# Patient Record
Sex: MALE | Race: BLACK OR AFRICAN AMERICAN | NOT HISPANIC OR LATINO | Employment: OTHER | URBAN - NONMETROPOLITAN AREA
[De-identification: names, ages, dates, MRNs, and addresses within clinical notes are randomized per-mention and may not be internally consistent; named-entity substitution may affect disease eponyms.]

---
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
## 2019-05-28 NOTE — PROGRESS NOTES
"   Subjective:  Gila Cerrato is a 19 y.o. male who presents for       Patient Active Problem List   Diagnosis   • Annual physical exam   • General medical examination   • Depression   • Encounter for screening for endocrine disorder   • Class 1 obesity with body mass index (BMI) of 34.0 to 34.9 in adult   • Autism           Current Outpatient Medications:   •  Selenium Sulfide 2.25 % lotion, Apply 1 application topically Daily. Apply daily for 5-15 minutes and then rinsed. Use daily for 1 week, then weekly for 1 month., Disp: 180 mL, Rfl: 3    7/18/18 Pt is 17 yo male who I am seeing for the first time. He is here to establish. He is accompanied with mother today. He was previously seen by Dr. Ren his previous PCP in [place] for about a year.  He currently lives with ecsar and was living with father before. Mother states he was tested for autism at age 1 but is not sure if he truly has this. He was enrolled in [employer] and was enrolled there from 3-5 years.  He graduated from [employer] at 17 years old. He is currently not enrolled in school.  Mother was also told by Pediatrician he has \"intellectual delay\" that he would outgrow it.  He is currently not taking medications.  Mother is legal guardian and states he is depressed lately. He states that there are things that he is aware about in the outside world that bother him.  Mother is concerned about his communication along with depression.  Today pt tells me he likes to draw animals. He also mentions the words \"anger, bam and disgust\" on several occasions     5/3019 pt is here for recheck and followup. He has CMH of autiism and depression. He has yet to get labwork done on last visit. Pt has history of autism and mother is concerned if pt is suitable to at home alone.  Pt's mother accompanied him today and is requesting letter stating he should not be at home alone. Mother is not working now. She states she " has left him home alone before.   Per mother One of the counselors at school who saw pt in the patient recommended pt not be not alone at home  Mother helps patient assist with daily activities.  Pt also gained 50 lbs since last visit. Mother states he eats a lot of junk foods and processed foods       Obesity   This is a chronic problem. The current episode started more than 1 year ago. The problem occurs constantly. Associated symptoms include fatigue. Pertinent negatives include no abdominal pain, anorexia, arthralgias, change in bowel habit, chest pain, chills, congestion, coughing, diaphoresis, fever, headaches, joint swelling, myalgias, nausea, neck pain, numbness, rash, sore throat, swollen glands, urinary symptoms, vertigo, visual change, vomiting or weakness. Nothing aggravates the symptoms. He has tried nothing for the symptoms. The treatment provided no relief.   Mental Health Problem   The primary symptoms do not include dysphoric mood, delusions, hallucinations, bizarre behavior, disorganized speech, negative symptoms or somatic symptoms. This is a chronic problem.   Additional symptoms of the illness include fatigue. Additional symptoms of the illness do not include anhedonia, insomnia, hypersomnia, appetite change, unexpected weight change, agitation, psychomotor retardation, feelings of worthlessness, attention impairment, euphoric mood, increased goal-directed activity, flight of ideas, inflated self-esteem, decreased need for sleep, distractible, poor judgment, visual change, headaches, abdominal pain or seizures. He does not admit to suicidal ideas. He does not have a plan to commit suicide. He does not contemplate harming himself. He has not already injured self. He does not contemplate injuring another person.    Depression   Visit Type: initial  Progression since onset: gradually worsening  Patient is not experiencing: anhedonia, chest pain, choking sensation, compulsions, confusion, decreased  concentration, depressed mood, dizziness, dry mouth, excessive worry, fatigue, feelings of hopelessness, feelings of worthlessness, hypersomnia, hyperventilation, impotence, insomnia, irritability, malaise, memory impairment, muscle tension, nausea, nervousness/anxiety, obsessions, palpitations, panic, psychomotor agitation, psychomotor retardation, restlessness, shortness of breath, suicidal ideas, suicidal planning, thoughts of death, weight gain and weight loss.  Severity: moderate   Sleep quality: good       Review of Systems  Review of Systems   Constitutional: Positive for activity change and fatigue. Negative for appetite change, chills, diaphoresis, fever and unexpected weight change.   HENT: Negative for congestion, postnasal drip, rhinorrhea, sinus pressure, sinus pain, sneezing, sore throat, trouble swallowing and voice change.    Respiratory: Negative for cough, choking, chest tightness, shortness of breath, wheezing and stridor.    Cardiovascular: Negative for chest pain.   Gastrointestinal: Negative for abdominal pain, anorexia, change in bowel habit, diarrhea, nausea and vomiting.   Musculoskeletal: Negative for arthralgias, joint swelling, myalgias and neck pain.   Skin: Negative for rash.   Neurological: Negative for vertigo, seizures, weakness, numbness and headaches.   Psychiatric/Behavioral: Negative for agitation, dysphoric mood and hallucinations. The patient does not have insomnia.        Patient Active Problem List   Diagnosis   • Annual physical exam   • General medical examination   • Depression   • Encounter for screening for endocrine disorder   • Class 1 obesity with body mass index (BMI) of 34.0 to 34.9 in adult   • Autism     Past Surgical History:   Procedure Laterality Date   • INJECTION OF MEDICATION  11/19/2013    Dexamethasone (1)   • INNER EAR SURGERY  08/20/2004    Retained tube of right ear, extruding tube of left ear into the ext. auditory canal wiht persistent tympanic  "membrane perforation and bilateral creumen impactions   • MYRINGOTOMY Bilateral 06/29/2001     Social History     Socioeconomic History   • Marital status: Single     Spouse name: Not on file   • Number of children: Not on file   • Years of education: Not on file   • Highest education level: Not on file   Tobacco Use   • Smoking status: Never Smoker   • Smokeless tobacco: Never Used   Substance and Sexual Activity   • Alcohol use: No   • Sexual activity: No     Family History   Problem Relation Age of Onset   • Arthritis Mother    • Hypertension Mother    • Cancer Sister    • Depression Sister    • Anxiety disorder Maternal Aunt    • Depression Maternal Aunt    • Cancer Paternal Aunt    • Arthritis Maternal Grandmother    • Depression Maternal Grandmother    • Hypertension Maternal Grandmother    • Cancer Maternal Grandfather    • Cancer Paternal Grandmother      No visits with results within 6 Month(s) from this visit.   Latest known visit with results is:   Conversion Encounter on 08/20/2004   Component Date Value Ref Range Status   • Spec Descr 1 08/20/2004 SPECIMEN(S): A EAR TUBES   Final   • Gross Description 08/20/2004    Final                    Value:  GROSS DESCRIPTION:  The specimen is labeled \"retained ear tubes\" and consists of 2  bobbin-shaped blue ear tubes measuring 0.2 cm in length.  The flanges on  each end measure 0.3 cm in diameter.  The tubes have some brown material  attached.  No sections are submitted.     • Final Diagnosis 08/20/2004    Final                    Value:  FINAL DIAGNOSIS:  PRESSURE EQUALIZING TUBES, BILATERAL EARS, REMOVAL:            GROSS EXAMINATION ONLY.              [facility]    DIAGNOSIS CODE:  3     • Comment 08/20/2004    Final                    Value:  CLINICAL DIAGNOSIS:  Ear tubes     • CONVERTED (HISTORICAL) FINAL PATHO* 08/20/2004    Final                    Value:Diagnostician:  ASMITA JACOBSEN M.D.  Pathologist  Electronically Signed 08/23/2004        No image results " "found.    [unfilled]  Immunization History   Administered Date(s) Administered   • DTaP 1999, 1999, 1999, 06/06/2000, 05/21/2003   • Hep B, Adolescent or Pediatric 1999, 1999, 1999   • Hib (HbOC) 1999, 1999, 1999, 06/06/2000   • IPV 1999, 1999, 1999, 06/06/2000   • MMR 06/06/2000, 05/21/2003   • Tdap 08/16/2010   • Varicella 06/06/2000, 08/16/2010       The following portions of the patient's history were reviewed and updated as appropriate: allergies, current medications, past family history, past medical history, past social history, past surgical history and problem list.        Physical Exam  /76   Pulse 91   Temp 97.9 °F (36.6 °C)   Ht 162.6 cm (64\")   Wt 89.9 kg (198 lb 3.2 oz)   SpO2 96%   BMI 34.02 kg/m²     Physical Exam   Constitutional: He is oriented to person, place, and time. He appears well-developed and well-nourished.   HENT:   Head: Normocephalic and atraumatic.   Right Ear: External ear normal.   Eyes: Conjunctivae and EOM are normal. Pupils are equal, round, and reactive to light.   Neck: Normal range of motion. Neck supple.   Cardiovascular: Normal rate, regular rhythm and normal heart sounds.   No murmur heard.  Pulmonary/Chest: Effort normal and breath sounds normal. No respiratory distress.   Abdominal: Soft. Bowel sounds are normal. He exhibits no distension. There is no tenderness.   Obese abdomen    Musculoskeletal: Normal range of motion. He exhibits no edema or deformity.   Neurological: He is alert and oriented to person, place, and time. No cranial nerve deficit.   Skin: Skin is warm. No rash noted. He is not diaphoretic. No erythema. No pallor.   Psychiatric: He has a normal mood and affect. His behavior is normal.   Nursing note and vitals reviewed.      Assessment/Plan    Diagnosis Plan   1. Autism     2. Episode of recurrent major depressive disorder, unspecified depression episode severity " (CMS/[facility])     3. Encounter for screening for endocrine disorder     4. Annual physical exam     5. General medical examination  CBC Auto Differential    Comprehensive Metabolic Panel    TSH    T4, Free    T3, Free    Hemoglobin A1c    Lipid Panel    Vitamin D 25 Hydroxy   6. Class 1 obesity with body mass index (BMI) of 34.0 to 34.9 in adult, unspecified obesity type, unspecified whether serious comorbidity present  Hemoglobin A1c    Lipid Panel    Vitamin D 25 Hydroxy      -obesity -counseled weight loss >5 mintues. Gave healthy diet eating tips and high fiber diet   -wrote letter stating today pt should not be left at home alone due to safety issues counseled mother about safety of patient.   - will get thyroid studies cbc, cmp and get labwork   -annual physical exam done today  -advised pt to be safe and call with any questions or concerns. All questions addressed today.    -depression - will refer to counseling at [facility] for counseling . Evaluation appreciated.   -consider antidepressant such as zoloft. Pt is not suicidal  -gave information on austim and depression today.    -recheck in 6 months   -advised pt to be safe and call with any questions or concerns        This document has been electronically signed by Chalo Sinha MD on May 30, 2019 1:36 PM                    This document has been electronically signed by Chalo Sinha MD on May 30, 2019 1:36 PM

## 2019-05-30 ENCOUNTER — OFFICE VISIT (OUTPATIENT)
Dept: FAMILY MEDICINE CLINIC | Facility: CLINIC | Age: 19
End: 2019-05-30

## 2019-05-30 VITALS
SYSTOLIC BLOOD PRESSURE: 116 MMHG | OXYGEN SATURATION: 96 % | HEIGHT: 64 IN | TEMPERATURE: 97.9 F | WEIGHT: 198.2 LBS | DIASTOLIC BLOOD PRESSURE: 76 MMHG | BODY MASS INDEX: 33.84 KG/M2 | HEART RATE: 91 BPM

## 2019-05-30 DIAGNOSIS — F84.0 AUTISM: Primary | ICD-10-CM

## 2019-05-30 DIAGNOSIS — Z00.00 GENERAL MEDICAL EXAMINATION: ICD-10-CM

## 2019-05-30 DIAGNOSIS — E66.9 CLASS 1 OBESITY WITH BODY MASS INDEX (BMI) OF 34.0 TO 34.9 IN ADULT, UNSPECIFIED OBESITY TYPE, UNSPECIFIED WHETHER SERIOUS COMORBIDITY PRESENT: ICD-10-CM

## 2019-05-30 DIAGNOSIS — Z00.00 ANNUAL PHYSICAL EXAM: ICD-10-CM

## 2019-05-30 DIAGNOSIS — Z13.29 ENCOUNTER FOR SCREENING FOR ENDOCRINE DISORDER: ICD-10-CM

## 2019-05-30 DIAGNOSIS — F33.9 EPISODE OF RECURRENT MAJOR DEPRESSIVE DISORDER, UNSPECIFIED DEPRESSION EPISODE SEVERITY (HCC): ICD-10-CM

## 2019-05-30 PROCEDURE — 99214 OFFICE O/P EST MOD 30 MIN: CPT | Performed by: FAMILY MEDICINE

## 2019-05-30 NOTE — PATIENT INSTRUCTIONS
High-Fiber Diet  Fiber, also called dietary fiber, is a type of carbohydrate found in fruits, vegetables, whole grains, and beans. A high-fiber diet can have many health benefits. Your health care provider may recommend a high-fiber diet to help:  · Prevent constipation. Fiber can make your bowel movements more regular.  · Lower your cholesterol.  · Relieve hemorrhoids, uncomplicated diverticulosis, or irritable bowel syndrome.  · Prevent overeating as part of a weight-loss plan.  · Prevent heart disease, type 2 diabetes, and certain cancers.    What is my plan?  The recommended daily intake of fiber includes:  · 38 grams for men under age 50.  · 30 grams for men over age 50.  · 25 grams for women under age 50.  · 21 grams for women over age 50.    You can get the recommended daily intake of dietary fiber by eating a variety of fruits, vegetables, grains, and beans. Your health care provider may also recommend a fiber supplement if it is not possible to get enough fiber through your diet.  What do I need to know about a high-fiber diet?  · Fiber supplements have not been widely studied for their effectiveness, so it is better to get fiber through food sources.  · Always check the fiber content on the nutrition facts label of any prepackaged food. Look for foods that contain at least 5 grams of fiber per serving.  · Ask your dietitian if you have questions about specific foods that are related to your condition, especially if those foods are not listed in the following section.  · Increase your daily fiber consumption gradually. Increasing your intake of dietary fiber too quickly may cause bloating, cramping, or gas.  · Drink plenty of water. Water helps you to digest fiber.  What foods can I eat?  Grains  Whole-grain breads. Multigrain cereal. Oats and oatmeal. Brown rice. Barley. Bulgur wheat. Millet. Bran muffins. Popcorn. Rye wafer crackers.  Vegetables  Sweet potatoes. Spinach. Kale. Artichokes. Cabbage.  Broccoli. Green peas. Carrots. Squash.  Fruits  Berries. Pears. Apples. Oranges. Avocados. Prunes and raisins. Dried figs.  Meats and Other Protein Sources  Navy, kidney, beltran, and soy beans. Split peas. Lentils. Nuts and seeds.  Dairy  Fiber-fortified yogurt.  Beverages  Fiber-fortified soy milk. Fiber-fortified orange juice.  Other  Fiber bars.  The items listed above may not be a complete list of recommended foods or beverages. Contact your dietitian for more options.  What foods are not recommended?  Grains  White bread. Pasta made with refined flour. White rice.  Vegetables  Fried potatoes. Canned vegetables. Well-cooked vegetables.  Fruits  Fruit juice. Cooked, strained fruit.  Meats and Other Protein Sources  Fatty cuts of meat. Fried poultry or fried fish.  Dairy  Milk. Yogurt. Cream cheese. Sour cream.  Beverages  Soft drinks.  Other  Cakes and pastries. Butter and oils.  The items listed above may not be a complete list of foods and beverages to avoid. Contact your dietitian for more information.  What are some tips for including high-fiber foods in my diet?  · Eat a wide variety of high-fiber foods.  · Make sure that half of all grains consumed each day are whole grains.  · Replace breads and cereals made from refined flour or white flour with whole-grain breads and cereals.  · Replace white rice with brown rice, bulgur wheat, or millet.  · Start the day with a breakfast that is high in fiber, such as a cereal that contains at least 5 grams of fiber per serving.  · Use beans in place of meat in soups, salads, or pasta.  · Eat high-fiber snacks, such as berries, raw vegetables, nuts, or popcorn.  This information is not intended to replace advice given to you by your health care provider. Make sure you discuss any questions you have with your health care provider.  Document Released: 12/18/2006 Document Revised: 05/25/2017 Document Reviewed: 06/02/2015  Elsevier Interactive Patient Education © 2018  Elsevier Inc.

## 2019-07-01 ENCOUNTER — OFFICE VISIT (OUTPATIENT)
Dept: FAMILY MEDICINE CLINIC | Facility: CLINIC | Age: 19
End: 2019-07-01

## 2019-07-01 VITALS
HEIGHT: 64 IN | WEIGHT: 196.8 LBS | TEMPERATURE: 98.6 F | OXYGEN SATURATION: 98 % | HEART RATE: 82 BPM | DIASTOLIC BLOOD PRESSURE: 70 MMHG | SYSTOLIC BLOOD PRESSURE: 110 MMHG | BODY MASS INDEX: 33.6 KG/M2

## 2019-07-01 DIAGNOSIS — F33.9 EPISODE OF RECURRENT MAJOR DEPRESSIVE DISORDER, UNSPECIFIED DEPRESSION EPISODE SEVERITY (HCC): ICD-10-CM

## 2019-07-01 DIAGNOSIS — E66.9 CLASS 1 OBESITY WITH BODY MASS INDEX (BMI) OF 34.0 TO 34.9 IN ADULT, UNSPECIFIED OBESITY TYPE, UNSPECIFIED WHETHER SERIOUS COMORBIDITY PRESENT: Primary | ICD-10-CM

## 2019-07-01 DIAGNOSIS — F84.0 AUTISM: ICD-10-CM

## 2019-07-01 PROCEDURE — 99214 OFFICE O/P EST MOD 30 MIN: CPT | Performed by: FAMILY MEDICINE

## 2019-07-02 ENCOUNTER — LAB (OUTPATIENT)
Dept: LAB | Facility: HOSPITAL | Age: 19
End: 2019-07-02

## 2019-07-02 DIAGNOSIS — E66.9 CLASS 1 OBESITY WITH BODY MASS INDEX (BMI) OF 34.0 TO 34.9 IN ADULT, UNSPECIFIED OBESITY TYPE, UNSPECIFIED WHETHER SERIOUS COMORBIDITY PRESENT: ICD-10-CM

## 2019-07-02 DIAGNOSIS — Z00.00 GENERAL MEDICAL EXAMINATION: ICD-10-CM

## 2019-07-02 LAB
25(OH)D3 SERPL-MCNC: 11.8 NG/ML (ref 30–100)
ALBUMIN SERPL-MCNC: 4.9 G/DL (ref 3.5–5.2)
ALBUMIN/GLOB SERPL: 1.4 G/DL
ALP SERPL-CCNC: 71 U/L (ref 39–117)
ALT SERPL W P-5'-P-CCNC: 17 U/L (ref 1–41)
ANION GAP SERPL CALCULATED.3IONS-SCNC: 10.5 MMOL/L (ref 5–15)
AST SERPL-CCNC: 18 U/L (ref 1–40)
BILIRUB SERPL-MCNC: 1 MG/DL (ref 0.2–1.2)
BUN BLD-MCNC: 12 MG/DL (ref 6–20)
BUN/CREAT SERPL: 13.3 (ref 7–25)
CALCIUM SPEC-SCNC: 9.8 MG/DL (ref 8.6–10.5)
CHLORIDE SERPL-SCNC: 96 MMOL/L (ref 98–107)
CHOLEST SERPL-MCNC: 162 MG/DL (ref 0–200)
CO2 SERPL-SCNC: 27.5 MMOL/L (ref 22–29)
CREAT BLD-MCNC: 0.9 MG/DL (ref 0.76–1.27)
GFR SERPL CREATININE-BSD FRML MDRD: 130 ML/MIN/1.73
GLOBULIN UR ELPH-MCNC: 3.5 GM/DL
GLUCOSE BLD-MCNC: 94 MG/DL (ref 65–99)
HDLC SERPL-MCNC: 47 MG/DL (ref 40–60)
LDLC SERPL CALC-MCNC: 93 MG/DL (ref 0–100)
LDLC/HDLC SERPL: 1.98 {RATIO}
POTASSIUM BLD-SCNC: 4.3 MMOL/L (ref 3.5–5.2)
PROT SERPL-MCNC: 8.4 G/DL (ref 6–8.5)
SODIUM BLD-SCNC: 134 MMOL/L (ref 136–145)
T3FREE SERPL-MCNC: 3.73 PG/ML (ref 2–4.4)
T4 FREE SERPL-MCNC: 1.27 NG/DL (ref 0.93–1.7)
TRIGL SERPL-MCNC: 110 MG/DL (ref 0–150)
TSH SERPL DL<=0.05 MIU/L-ACNC: 1.19 MIU/ML (ref 0.27–4.2)
VLDLC SERPL-MCNC: 22 MG/DL (ref 5–40)

## 2019-07-02 PROCEDURE — 84439 ASSAY OF FREE THYROXINE: CPT | Performed by: FAMILY MEDICINE

## 2019-07-02 PROCEDURE — 84481 FREE ASSAY (FT-3): CPT | Performed by: FAMILY MEDICINE

## 2019-07-02 PROCEDURE — 85025 COMPLETE CBC W/AUTO DIFF WBC: CPT | Performed by: FAMILY MEDICINE

## 2019-07-02 PROCEDURE — 82306 VITAMIN D 25 HYDROXY: CPT

## 2019-07-02 PROCEDURE — 83036 HEMOGLOBIN GLYCOSYLATED A1C: CPT

## 2019-07-02 PROCEDURE — 80061 LIPID PANEL: CPT

## 2019-07-02 PROCEDURE — 80053 COMPREHEN METABOLIC PANEL: CPT | Performed by: FAMILY MEDICINE

## 2019-07-02 PROCEDURE — 84443 ASSAY THYROID STIM HORMONE: CPT | Performed by: FAMILY MEDICINE

## 2019-07-03 ENCOUNTER — TELEPHONE (OUTPATIENT)
Dept: FAMILY MEDICINE CLINIC | Facility: CLINIC | Age: 19
End: 2019-07-03

## 2019-07-03 LAB
BASOPHILS # BLD AUTO: 0.07 10*3/MM3 (ref 0–0.2)
BASOPHILS NFR BLD AUTO: 0.9 % (ref 0–1.5)
DEPRECATED RDW RBC AUTO: 46.6 FL (ref 37–54)
EOSINOPHIL # BLD AUTO: 0.23 10*3/MM3 (ref 0–0.4)
EOSINOPHIL NFR BLD AUTO: 2.9 % (ref 0.3–6.2)
ERYTHROCYTE [DISTWIDTH] IN BLOOD BY AUTOMATED COUNT: 13 % (ref 12.3–15.4)
HBA1C MFR BLD: 5.4 % (ref 4.8–5.6)
HCT VFR BLD AUTO: 50.6 % (ref 37.5–51)
HGB BLD-MCNC: 16.6 G/DL (ref 13–17.7)
IMM GRANULOCYTES # BLD AUTO: 0.04 10*3/MM3 (ref 0–0.05)
IMM GRANULOCYTES NFR BLD AUTO: 0.5 % (ref 0–0.5)
LYMPHOCYTES # BLD AUTO: 2.41 10*3/MM3 (ref 0.7–3.1)
LYMPHOCYTES NFR BLD AUTO: 30.7 % (ref 19.6–45.3)
MCH RBC QN AUTO: 31.8 PG (ref 26.6–33)
MCHC RBC AUTO-ENTMCNC: 32.8 G/DL (ref 31.5–35.7)
MCV RBC AUTO: 96.9 FL (ref 79–97)
MONOCYTES # BLD AUTO: 0.63 10*3/MM3 (ref 0.1–0.9)
MONOCYTES NFR BLD AUTO: 8 % (ref 5–12)
NEUTROPHILS # BLD AUTO: 4.48 10*3/MM3 (ref 1.7–7)
NEUTROPHILS NFR BLD AUTO: 57 % (ref 42.7–76)
NRBC BLD AUTO-RTO: 0 /100 WBC (ref 0–0.2)
PLATELET # BLD AUTO: 305 10*3/MM3 (ref 140–450)
PMV BLD AUTO: 12.7 FL (ref 6–12)
RBC # BLD AUTO: 5.22 10*6/MM3 (ref 4.14–5.8)
WBC NRBC COR # BLD: 7.86 10*3/MM3 (ref 3.4–10.8)

## 2019-07-03 NOTE — TELEPHONE ENCOUNTER
----- Message from Chalo Sinha MD sent at 7/3/2019  7:20 AM CDT -----  Call pt' mother    Thyroid studies normal     CMP showed sodium and chloride slightly low.  Will need to monitor.     Kidney function stable   Liver function stable    Recheck on next visit. Thanks

## 2019-07-03 NOTE — TELEPHONE ENCOUNTER
----- Message from Chalo Sinha MD sent at 7/3/2019  7:20 AM CDT -----  Call pt's mother    Vitamin D is low and recommend pt start taking Vitamin D3 50,000 units once a week. Give 4 pills and 3 refills      Lipid panel normal    Awaiting rest of labwork  Still unable to reach

## 2019-07-08 ENCOUNTER — TELEPHONE (OUTPATIENT)
Dept: FAMILY MEDICINE CLINIC | Facility: CLINIC | Age: 19
End: 2019-07-08

## 2019-07-08 NOTE — TELEPHONE ENCOUNTER
----- Message from Chalo Sinha MD sent at 7/5/2019  7:58 PM CDT -----  Pt is not anemic. Hemoglobin normal     WBC is normal    Recheck on next visit. Thanks

## 2019-07-08 NOTE — TELEPHONE ENCOUNTER
----- Message from Chalo Sinha MD sent at 7/5/2019  7:57 PM CDT -----  hga1c normal.  Pt is not diabetic or prediabetic.  Called pt margy

## 2019-08-21 NOTE — PROGRESS NOTES
Subjective:  Gila Cerrato is a 19 y.o. male who presents for       Patient Active Problem List   Diagnosis   • Annual physical exam   • General medical examination   • Depression   • Encounter for screening for endocrine disorder   • Class 1 obesity with body mass index (BMI) of 34.0 to 34.9 in adult   • Autism   • Otitis externa of both ears           Current Outpatient Medications:   •  neomycin-colistin-hydrocortisone-thonzonium (CORTISPORIN-TC) 3.3-3-10-0.5 MG/ML otic suspension, Administer 3 drops into both ears 4 (Four) Times a Day for 5 days., Disp: 10 mL, Rfl: 0  •  Selenium Sulfide 2.25 % lotion, Apply 1 application topically Daily. Apply daily for 5-15 minutes and then rinsed. Use daily for 1 week, then weekly for 1 month., Disp: 180 mL, Rfl: 3  •  vitamin D (ERGOCALCIFEROL) 70373 units capsule capsule, Take 1 capsule by mouth Every 7 (Seven) Days., Disp: 4 capsule, Rfl: 3      5/3019 pt is here for recheck and followup. He has CMH of autiism and depression. He has yet to get labwork done on last visit. Pt has history of autism and mother is concerned if pt is suitable to at home alone.  Pt's mother accompanied him today and is requesting letter stating he should not be at home alone. Mother is not working now. She states she has left him home alone before.   Per mother One of the counselors at school who saw pt in the patient recommended pt not be not alone at home  Mother helps patient assist with daily activities.  Pt also gained 50 lbs since last visit. Mother states he eats a lot of junk foods and processed foods      7/1/19 pt is here for recheck. He has yet to get labwork. He is accompanied with mother today.  Pt has gained significant amount of weight.   He lost  2 lbs . No new changes since last visit.  Pt was seeing Elvia Waddell for his austism and has been over a year  Since he last saw them.   No chest pain no dizzines no shortness of air     8/22/19 pt is here for  recheck. He has two issues. He has trouble sleeping at night. He does not drink caffeine no loud noises no radio. He also has bilateral ear pain for  >2 weeks. No fever. It hurts when he pulls on his ears  He has recurrent ear inrfections and tubes in ears in the past.. He lost 5 lbs since last visit  Had labwork on 7/2/19 he had low Vitamin D. Thyroids tudies normal hga1c normal. Lipid panel normal. CMP showed sodium slightly low and chloride at  96.    Kidney function and liver function normal . CBC  Is normal       Earache    There is pain in both ears. This is a recurrent problem. The current episode started more than 1 month ago. The problem occurs constantly. The problem has been gradually worsening. There has been no fever. The pain is at a severity of 6/10. The pain is moderate. Pertinent negatives include no abdominal pain, coughing, diarrhea, ear discharge, headaches, hearing loss, neck pain, rash, rhinorrhea, sore throat or vomiting. He has tried nothing for the symptoms. The treatment provided no relief. There is no history of a chronic ear infection, hearing loss or a tympanostomy tube.    Obesity   This is a chronic problem. The current episode started more than 1 year ago. The problem occurs constantly. Associated symptoms include fatigue. Pertinent negatives include no abdominal pain, anorexia, arthralgias, change in bowel habit, chest pain, chills, congestion, coughing, diaphoresis, fever, headaches, joint swelling, myalgias, nausea, neck pain, numbness, rash, sore throat, swollen glands, urinary symptoms, vertigo, visual change, vomiting or weakness. Nothing aggravates the symptoms. He has tried nothing for the symptoms. The treatment provided no relief.   Mental Health Problem   The primary symptoms do not include dysphoric mood, delusions, hallucinations, bizarre behavior, disorganized speech, negative symptoms or somatic symptoms. This is a chronic problem.   Additional symptoms of the illness  include fatigue. Additional symptoms of the illness do not include anhedonia, insomnia, hypersomnia, appetite change, unexpected weight change, agitation, psychomotor retardation, feelings of worthlessness, attention impairment, euphoric mood, increased goal-directed activity, flight of ideas, inflated self-esteem, decreased need for sleep, distractible, poor judgment, visual change, headaches, abdominal pain or seizures. He does not admit to suicidal ideas. He does not have a plan to commit suicide. He does not contemplate harming himself. He has not already injured self. He does not contemplate injuring another person.    Depression   Visit Type: initial  Progression since onset: gradually worsening  Patient is not experiencing: anhedonia, chest pain, choking sensation, compulsions, confusion, decreased concentration, depressed mood, dizziness, dry mouth, excessive worry, fatigue, feelings of hopelessness, feelings of worthlessness, hypersomnia, hyperventilation, impotence, insomnia, irritability, malaise, memory impairment, muscle tension, nausea, nervousness/anxiety, obsessions, palpitations, panic, psychomotor agitation, psychomotor retardation, restlessness, shortness of breath, suicidal ideas, suicidal planning, thoughts of death, weight gain and weight loss.  Severity: moderate   Sleep quality: good    Review of Systems  Review of Systems   Constitutional: Positive for activity change and fatigue. Negative for appetite change, chills, diaphoresis and fever.   HENT: Positive for ear pain. Negative for congestion, ear discharge, hearing loss, postnasal drip, rhinorrhea, sinus pressure, sinus pain, sneezing, sore throat, trouble swallowing and voice change.    Respiratory: Negative for cough, choking, chest tightness, shortness of breath, wheezing and stridor.    Cardiovascular: Negative for chest pain.   Gastrointestinal: Negative for abdominal pain, diarrhea, nausea and vomiting.   Musculoskeletal: Negative  for neck pain.   Skin: Negative for rash.   Neurological: Negative for weakness and headaches.   Psychiatric/Behavioral: Positive for agitation, behavioral problems and decreased concentration. The patient is nervous/anxious.        Patient Active Problem List   Diagnosis   • Annual physical exam   • General medical examination   • Depression   • Encounter for screening for endocrine disorder   • Class 1 obesity with body mass index (BMI) of 34.0 to 34.9 in adult   • Autism   • Otitis externa of both ears     Past Surgical History:   Procedure Laterality Date   • INJECTION OF MEDICATION  11/19/2013    Dexamethasone (1)   • INNER EAR SURGERY  08/20/2004    Retained tube of right ear, extruding tube of left ear into the ext. auditory canal wiht persistent tympanic membrane perforation and bilateral creumen impactions   • MYRINGOTOMY Bilateral 06/29/2001     Social History     Socioeconomic History   • Marital status: Single     Spouse name: Not on file   • Number of children: Not on file   • Years of education: Not on file   • Highest education level: Not on file   Tobacco Use   • Smoking status: Never Smoker   • Smokeless tobacco: Never Used   Substance and Sexual Activity   • Alcohol use: No   • Sexual activity: No     Family History   Problem Relation Age of Onset   • Arthritis Mother    • Hypertension Mother    • Cancer Sister    • Depression Sister    • Anxiety disorder Maternal Aunt    • Depression Maternal Aunt    • Cancer Paternal Aunt    • Arthritis Maternal Grandmother    • Depression Maternal Grandmother    • Hypertension Maternal Grandmother    • Cancer Maternal Grandfather    • Cancer Paternal Grandmother      Lab on 07/02/2019   Component Date Value Ref Range Status   • Hemoglobin A1C 07/02/2019 5.40  4.80 - 5.60 % Final   • Total Cholesterol 07/02/2019 162  0 - 200 mg/dL Final   • Triglycerides 07/02/2019 110  0 - 150 mg/dL Final   • HDL Cholesterol 07/02/2019 47  40 - 60 mg/dL Final   • LDL Cholesterol  " 07/02/2019 93  0 - 100 mg/dL Final   • VLDL Cholesterol 07/02/2019 22  5 - 40 mg/dL Final   • LDL/HDL Ratio 07/02/2019 1.98   Final   • 25 Hydroxy, Vitamin D 07/02/2019 11.8* 30.0 - 100.0 ng/ml Final      No image results found.    @[email or web address]  Immunization History   Administered Date(s) Administered   • DTaP 1999, 1999, 1999, 06/06/2000, 05/21/2003   • Hep B, Adolescent or Pediatric 1999, 1999, 1999   • Hib (HbOC) 1999, 1999, 1999, 06/06/2000   • IPV 1999, 1999, 1999, 06/06/2000   • MMR 06/06/2000, 05/21/2003   • Tdap 08/16/2010   • Varicella 06/06/2000, 08/16/2010       The following portions of the patient's history were reviewed and updated as appropriate: allergies, current medications, past family history, past medical history, past social history, past surgical history and problem list.        Physical Exam  /80   Pulse 84   Temp 97.9 °F (36.6 °C)   Ht 162.6 cm (64\")   Wt 86.8 kg (191 lb 4.8 oz)   SpO2 97%   BMI 32.84 kg/m²     Physical Exam   Constitutional: He is oriented to person, place, and time. He appears well-developed and well-nourished.   HENT:   Head: Normocephalic and atraumatic.   Right Ear: External ear normal.   Ears:    Eyes: Conjunctivae and EOM are normal. Pupils are equal, round, and reactive to light.   Neck: Normal range of motion. Neck supple.   Cardiovascular: Normal rate, regular rhythm and normal heart sounds.   No murmur heard.  Pulmonary/Chest: Effort normal and breath sounds normal. No respiratory distress.   Abdominal: Soft. Bowel sounds are normal. He exhibits no distension. There is no tenderness.   Obese abdomen    Musculoskeletal: Normal range of motion. He exhibits no edema or deformity.   Neurological: He is alert and oriented to person, place, and time. No cranial nerve deficit.   Skin: Skin is warm. No rash noted. He is not diaphoretic. No erythema. No pallor.   Psychiatric: He has a " normal mood and affect. His behavior is normal.   Nursing note and vitals reviewed.      Assessment/Plan    Diagnosis Plan   1. Otitis externa of both ears, unspecified chronicity, unspecified type     2. Class 1 obesity with body mass index (BMI) of 34.0 to 34.9 in adult, unspecified obesity type, unspecified whether serious comorbidity present     3. Episode of recurrent major depressive disorder, unspecified depression episode severity (CMS/HCC)     4. Autism           -went over labwork   -obesity -counseled weight loss >5 mintues. Gave healthy diet eating tips and high fiber diet   -wrote letter stating today pt should not be left at home alone due to safety issues counseled mother about safety of patient.   -filled [employer] form   -advised pt to be safe and call with any questions or concerns. All questions addressed today.    -depression - will refer to counseling at [facility] for counseling . Evaluation appreciated.   -consider antidepressant such as zoloft. Pt is not suicidal  -gave information on austim and depression today.    -recheck in  3 months   -vitamin D deficiency - vitamin D once a week  -otitis externa - cortisporin drop 4 drops to each ear 3 times a day for 5 days   -advised pt to be safe and call with any questions or concerns        This document has been electronically signed by Chalo Sinha MD on August 22, 2019 3:26 PM

## 2019-08-22 ENCOUNTER — OFFICE VISIT (OUTPATIENT)
Dept: FAMILY MEDICINE CLINIC | Facility: CLINIC | Age: 19
End: 2019-08-22

## 2019-08-22 VITALS
OXYGEN SATURATION: 97 % | BODY MASS INDEX: 32.66 KG/M2 | TEMPERATURE: 97.9 F | HEART RATE: 84 BPM | DIASTOLIC BLOOD PRESSURE: 80 MMHG | HEIGHT: 64 IN | WEIGHT: 191.3 LBS | SYSTOLIC BLOOD PRESSURE: 120 MMHG

## 2019-08-22 DIAGNOSIS — H60.93 OTITIS EXTERNA OF BOTH EARS, UNSPECIFIED CHRONICITY, UNSPECIFIED TYPE: Primary | ICD-10-CM

## 2019-08-22 DIAGNOSIS — F84.0 AUTISM: ICD-10-CM

## 2019-08-22 DIAGNOSIS — F33.9 EPISODE OF RECURRENT MAJOR DEPRESSIVE DISORDER, UNSPECIFIED DEPRESSION EPISODE SEVERITY (HCC): ICD-10-CM

## 2019-08-22 DIAGNOSIS — E66.9 CLASS 1 OBESITY WITH BODY MASS INDEX (BMI) OF 34.0 TO 34.9 IN ADULT, UNSPECIFIED OBESITY TYPE, UNSPECIFIED WHETHER SERIOUS COMORBIDITY PRESENT: ICD-10-CM

## 2019-08-22 PROCEDURE — 99214 OFFICE O/P EST MOD 30 MIN: CPT | Performed by: FAMILY MEDICINE

## 2019-08-22 RX ORDER — ERGOCALCIFEROL 1.25 MG/1
50000 CAPSULE ORAL
Qty: 4 CAPSULE | Refills: 3 | Status: SHIPPED | OUTPATIENT
Start: 2019-08-22 | End: 2020-01-06 | Stop reason: SDUPTHER

## 2019-08-22 RX ORDER — AMOXICILLIN 500 MG/1
1000 CAPSULE ORAL 2 TIMES DAILY
Qty: 10 CAPSULE | Refills: 0 | Status: SHIPPED | OUTPATIENT
Start: 2019-08-22 | End: 2019-08-23

## 2019-08-22 NOTE — PATIENT INSTRUCTIONS
Vitamin D Deficiency  Vitamin D deficiency is when your body does not have enough vitamin D. Vitamin D is important to your body for many reasons:  · It helps the body to absorb two important minerals, called calcium and phosphorus.  · It plays a role in bone health.  · It may help to prevent some diseases, such as diabetes and multiple sclerosis.  · It plays a role in muscle function, including heart function.  You can get vitamin D by:  · Eating foods that naturally contain vitamin D.  · Eating or drinking milk or other dairy products that have vitamin D added to them.  · Taking a vitamin D supplement or a multivitamin supplement that contains vitamin D.  · Being in the sun. Your body naturally makes vitamin D when your skin is exposed to sunlight. Your body changes the sunlight into a form of the vitamin that the body can use.  If vitamin D deficiency is severe, it can cause a condition in which your bones become soft. In adults, this condition is called osteomalacia. In children, this condition is called rickets.  What are the causes?  Vitamin D deficiency may be caused by:  · Not eating enough foods that contain vitamin D.  · Not getting enough sun exposure.  · Having certain digestive system diseases that make it difficult for your body to absorb vitamin D. These diseases include Crohn disease, chronic pancreatitis, and cystic fibrosis.  · Having a surgery in which a part of the stomach or a part of the small intestine is removed.  · Being obese.  · Having chronic kidney disease or liver disease.  What increases the risk?  This condition is more likely to develop in:  · Older people.  · People who do not spend much time outdoors.  · People who live in a long-term care facility.  · People who have had broken bones.  · People with weak or thin bones (osteoporosis).  · People who have a disease or condition that changes how the body absorbs vitamin D.  · People who have dark skin.  · People who take certain  medicines, such as steroid medicines or certain seizure medicines.  · People who are overweight or obese.  What are the signs or symptoms?  In mild cases of vitamin D deficiency, there may not be any symptoms. If the condition is severe, symptoms may include:  · Bone pain.  · Muscle pain.  · Falling often.  · Broken bones caused by a minor injury.  How is this diagnosed?  This condition is usually diagnosed with a blood test.  How is this treated?  Treatment for this condition may depend on what caused the condition. Treatment options include:  · Taking vitamin D supplements.  · Taking a calcium supplement. Your health care provider will suggest what dose is best for you.  Follow these instructions at home:  · Take medicines and supplements only as told by your health care provider.  · Eat foods that contain vitamin D. Choices include:  ? Fortified dairy products, cereals, or juices. Fortified means that vitamin D has been added to the food. Check the label on the package to be sure.  ? Fatty fish, such as salmon or trout.  ? Eggs.  ? Oysters.  · Do not use a tanning bed.  · Maintain a healthy weight. Lose weight, if needed.  · Keep all follow-up visits as told by your health care provider. This is important.  Contact a health care provider if:  · Your symptoms do not go away.  · You feel like throwing up (nausea) or you throw up (vomit).  · You have fewer bowel movements than usual or it is difficult for you to have a bowel movement (constipation).  This information is not intended to replace advice given to you by your health care provider. Make sure you discuss any questions you have with your health care provider.  Document Released: 03/11/2013 Document Revised: 05/31/2017 Document Reviewed: 05/04/2016  Elsevier Interactive Patient Education © 2019 [employer]    Otitis Externa    Otitis externa is an infection of the outer ear canal. The outer ear canal is the area between the outside of the ear and the  "eardrum. Otitis externa is sometimes called \"swimmer's ear.\"  What are the causes?  This condition may be caused by:  · Swimming in dirty water.  · Moisture in the ear.  · An injury to the inside of the ear.  · An object stuck in the ear.  · A cut or scrape on the outside of the ear.  What increases the risk?  This condition is more likely to develop in swimmers.  What are the signs or symptoms?  The first symptom of this condition is often itching in the ear. Later signs and symptoms include:  · Swelling of the ear.  · Redness in the ear.  · Ear pain. The pain may get worse when you pull on your ear.  · Pus coming from the ear.  How is this diagnosed?  This condition may be diagnosed by examining the ear and testing fluid from the ear for bacteria and funguses.  How is this treated?  This condition may be treated with:  · Antibiotic ear drops. These are often given for 10-14 days.  · Medicine to reduce itching and swelling.  Follow these instructions at home:  · If you were prescribed antibiotic ear drops, apply them as told by your health care provider. Do not stop using the antibiotic even if your condition improves.  · Take over-the-counter and prescription medicines only as told by your health care provider.  · Keep all follow-up visits as told by your health care provider. This is important.  How is this prevented?  · Keep your ear dry. Use the corner of a towel to dry your ear after you swim or bathe.  · Avoid scratching or putting things in your ear. Doing these things can damage the ear canal or remove the protective wax that lines it, which makes it easier for bacteria and funguses to grow.  · Avoid swimming in lakes, polluted water, or pools that may not have the right amount of chlorine.  · Consider making ear drops and putting 3 or 4 drops in each ear after you swim. Ask your health care provider about how you can make ear drops.  Contact a health care provider if:  · You have a fever.  · After 3 days " "your ear is still red, swollen, painful, or draining pus.  · Your redness, swelling, or pain gets worse.  · You have a severe headache.  · You have redness, swelling, pain, or tenderness in the area behind your ear.  This information is not intended to replace advice given to you by your health care provider. Make sure you discuss any questions you have with your health care provider.  Document Released: 12/18/2006 Document Revised: 01/24/2017 Document Reviewed: 09/26/2016  [employer] Interactive Patient Education © 2019 [employer]    Otitis Externa    Otitis externa is an infection of the outer ear canal. The outer ear canal is the area between the outside of the ear and the eardrum. Otitis externa is sometimes called \"swimmer's ear.\"  What are the causes?  This condition may be caused by:  · Swimming in dirty water.  · Moisture in the ear.  · An injury to the inside of the ear.  · An object stuck in the ear.  · A cut or scrape on the outside of the ear.  What increases the risk?  This condition is more likely to develop in swimmers.  What are the signs or symptoms?  The first symptom of this condition is often itching in the ear. Later signs and symptoms include:  · Swelling of the ear.  · Redness in the ear.  · Ear pain. The pain may get worse when you pull on your ear.  · Pus coming from the ear.  How is this diagnosed?  This condition may be diagnosed by examining the ear and testing fluid from the ear for bacteria and funguses.  How is this treated?  This condition may be treated with:  · Antibiotic ear drops. These are often given for 10-14 days.  · Medicine to reduce itching and swelling.  Follow these instructions at home:  · If you were prescribed antibiotic ear drops, apply them as told by your health care provider. Do not stop using the antibiotic even if your condition improves.  · Take over-the-counter and prescription medicines only as told by your health care provider.  · Keep all follow-up visits " as told by your health care provider. This is important.  How is this prevented?  · Keep your ear dry. Use the corner of a towel to dry your ear after you swim or bathe.  · Avoid scratching or putting things in your ear. Doing these things can damage the ear canal or remove the protective wax that lines it, which makes it easier for bacteria and funguses to grow.  · Avoid swimming in lakes, polluted water, or pools that may not have the right amount of chlorine.  · Consider making ear drops and putting 3 or 4 drops in each ear after you swim. Ask your health care provider about how you can make ear drops.  Contact a health care provider if:  · You have a fever.  · After 3 days your ear is still red, swollen, painful, or draining pus.  · Your redness, swelling, or pain gets worse.  · You have a severe headache.  · You have redness, swelling, pain, or tenderness in the area behind your ear.  This information is not intended to replace advice given to you by your health care provider. Make sure you discuss any questions you have with your health care provider.  Document Released: 12/18/2006 Document Revised: 01/24/2017 Document Reviewed: 09/26/2016  [employer] Interactive Patient Education © 2019 [employer]  Hydrocortisone; Neomycin; Polymyxin B ear suspension  What is this medicine?  HYDROCORTISONE; NEOMYCIN; and POLYMYXIN B (emeterio droe KOR ti sone; nee oh MYE sin; chris i MIX in B) is used to treat ear infections.  This medicine may be used for other purposes; ask your health care provider or pharmacist if you have questions.  COMMON BRAND NAME(S): AK-Spore HC, AK-Spore HC Otic, Antibiotic Otic, Aural, Cortisporin, Cortomycin, Duomycin-HC, Yael-Sone, Oticin HC, Otimar, Pediotic, Uad  What should I tell my health care provider before I take this medicine?  They need to know if you have any of these conditions:  -any other active infections  -chronic ear infections or fluid in the ear  -perforated ear drum  -an unusual or  allergic reaction to hydrocortisone, neomycin, polymyxin B, sulfites, other medicines, foods, dyes, or preservatives  -pregnant or trying to get pregnant  -breast-feeding  How should I use this medicine?  This medicine is only for use in the ears. Wash your hands with soap and water. Clean your ear of any fluid that can be easily removed. Do not insert any object or swab into the ear canal. Gently warm the bottle by holding it in the hand for 1 to 2 minutes. Lie down on your side with the infected ear up. Try not to touch the tip of the dropper to your ear, fingertips, or other surface. Shake the bottle immediately before using. Squeeze the bottle gently to put the prescribed number of drops in the ear canal. Stay in this position for 30 to 60 seconds to help the drops soak into the ear. Repeat the steps for the other ear if both ears are infected. Do not use your medicine more often than directed. Finish the full course of medicine prescribed by your doctor or health care professional even if you think your condition is better.  Talk to your pediatrician regarding the use of this medicine in children. While this drug may be prescribed for selected conditions, precautions do apply.  Overdosage: If you think you have taken too much of this medicine contact a poison control center or emergency room at once.  NOTE: This medicine is only for you. Do not share this medicine with others.  What if I miss a dose?  If you miss a dose, use it as soon as you can. If it is almost time for your next dose, use only that dose. Do not take double or extra doses.  What may interact with this medicine?  Interactions are not expected. Do not use other ear products without talking to your doctor or health care professional.  This list may not describe all possible interactions. Give your health care provider a list of all the medicines, herbs, non-prescription drugs, or dietary supplements you use. Also tell them if you smoke, drink  alcohol, or use illegal drugs. Some items may interact with your medicine.  What should I watch for while using this medicine?  Tell your doctor or health care professional if your ear infection does not get better in a few days. Do not use longer than 10 days unless instructed by your doctor or health care professional.  If rash or allergic reaction occurs, stop the product immediately and contact your physician.  It is important that you keep the infected ear(s) clean and dry. When bathing, try not to get the infected ear(s) wet. Do not go swimming unless your doctor or health care professional has told you otherwise.  To prevent the spread of infection, do not share ear products, or share towels and washcloths with anyone else.  What side effects may I notice from receiving this medicine?  Side effects that you should report to your doctor or health care professional as soon as possible:  -rash  -red, itchy, dry scaly skin at the affected site  -worsening ear pain  Side effects that usually do not require medical attention (report to your doctor or health care professional if they continue or are bothersome):  -abnormal sensation in the ear  -burning or stinging while putting the drops in the ear  This list may not describe all possible side effects. Call your doctor for medical advice about side effects. You may report side effects to FDA at 0-627-FDA-5844.  Where should I keep my medicine?  Keep out of the reach of children.  Store at room temperature between 15 and 25 degrees C (59 and 77 degrees F). Do not freeze. Throw away any unused medicine after the expiration date.  NOTE: This sheet is a summary. It may not cover all possible information. If you have questions about this medicine, talk to your doctor, pharmacist, or health care provider.  © 2019 [employer] (2016-06-07 15:07:57)    Otitis Externa    Otitis externa is an infection of the outer ear canal. The outer ear canal is the area between  "the outside of the ear and the eardrum. Otitis externa is sometimes called \"swimmer's ear.\"  What are the causes?  This condition may be caused by:  · Swimming in dirty water.  · Moisture in the ear.  · An injury to the inside of the ear.  · An object stuck in the ear.  · A cut or scrape on the outside of the ear.  What increases the risk?  This condition is more likely to develop in swimmers.  What are the signs or symptoms?  The first symptom of this condition is often itching in the ear. Later signs and symptoms include:  · Swelling of the ear.  · Redness in the ear.  · Ear pain. The pain may get worse when you pull on your ear.  · Pus coming from the ear.  How is this diagnosed?  This condition may be diagnosed by examining the ear and testing fluid from the ear for bacteria and funguses.  How is this treated?  This condition may be treated with:  · Antibiotic ear drops. These are often given for 10-14 days.  · Medicine to reduce itching and swelling.  Follow these instructions at home:  · If you were prescribed antibiotic ear drops, apply them as told by your health care provider. Do not stop using the antibiotic even if your condition improves.  · Take over-the-counter and prescription medicines only as told by your health care provider.  · Keep all follow-up visits as told by your health care provider. This is important.  How is this prevented?  · Keep your ear dry. Use the corner of a towel to dry your ear after you swim or bathe.  · Avoid scratching or putting things in your ear. Doing these things can damage the ear canal or remove the protective wax that lines it, which makes it easier for bacteria and funguses to grow.  · Avoid swimming in lakes, polluted water, or pools that may not have the right amount of chlorine.  · Consider making ear drops and putting 3 or 4 drops in each ear after you swim. Ask your health care provider about how you can make ear drops.  Contact a health care provider if:  · You " have a fever.  · After 3 days your ear is still red, swollen, painful, or draining pus.  · Your redness, swelling, or pain gets worse.  · You have a severe headache.  · You have redness, swelling, pain, or tenderness in the area behind your ear.  This information is not intended to replace advice given to you by your health care provider. Make sure you discuss any questions you have with your health care provider.  Document Released: 12/18/2006 Document Revised: 01/24/2017 Document Reviewed: 09/26/2016  [employer] Interactive Patient Education © 2019 [employer].

## 2019-08-23 ENCOUNTER — TELEPHONE (OUTPATIENT)
Dept: FAMILY MEDICINE CLINIC | Facility: CLINIC | Age: 19
End: 2019-08-23

## 2019-08-23 RX ORDER — AMOXICILLIN 250 MG/5ML
500 POWDER, FOR SUSPENSION ORAL 2 TIMES DAILY
Qty: 100 ML | Refills: 0 | Status: SHIPPED | OUTPATIENT
Start: 2019-08-23 | End: 2019-08-28

## 2019-08-23 NOTE — TELEPHONE ENCOUNTER
Pt was given amoxicillin capsules for an ear infection. Maurisio states pt cannot take capsules. Would like to know if could have liquid sent in.

## 2019-08-23 NOTE — TELEPHONE ENCOUNTER
Call mother sent amoxicillin 500 mg to take every 12 hours. In liquid form.  Take 10 ml per each dose twice a day for 5 days. Thanks

## 2019-09-01 NOTE — PROGRESS NOTES
Subjective:  Gila Cerrato is a 19 y.o. male who presents for       Patient Active Problem List   Diagnosis   • Annual physical exam   • General medical examination   • Depression   • Encounter for screening for endocrine disorder   • Class 1 obesity with body mass index (BMI) of 34.0 to 34.9 in adult   • Autism   • Otitis externa of both ears           Current Outpatient Medications:   •  Selenium Sulfide 2.25 % lotion, Apply 1 application topically Daily. Apply daily for 5-15 minutes and then rinsed. Use daily for 1 week, then weekly for 1 month., Disp: 180 mL, Rfl: 3  •  vitamin D (ERGOCALCIFEROL) 26058 units capsule capsule, Take 1 capsule by mouth Every 7 (Seven) Days., Disp: 4 capsule, Rfl: 3    HPI     Pt is 20 yo male with history of vitamin D deficiency,  Autism, obesity,  Major depression     5/3019 pt is here for recheck and followup. He has CMH of autiism and depression. He has yet to get labwork done on last visit. Pt has history of autism and mother is concerned if pt is suitable to at home alone.  Pt's mother accompanied him today and is requesting letter stating he should not be at home alone. Mother is not working now. She states she has left him home alone before.   Per mother One of the counselors at school who saw pt in the patient recommended pt not be not alone at home  Mother helps patient assist with daily activities.  Pt also gained 50 lbs since last visit. Mother states he eats a lot of junk foods and processed foods      7/1/19 pt is here for recheck. He has yet to get labwork. He is accompanied with mother today.  Pt has gained significant amount of weight.   He lost  2 lbs . No new changes since last visit.  Pt was seeing [facility] for his austism and has been over a year  Since he last saw them.   No chest pain no dizzines no shortness of air      8/22/19 pt is here for recheck. He has two issues. He has trouble sleeping at night. He does not drink caffeine no  loud noises no radio. He also has bilateral ear pain for  >2 weeks. No fever. It hurts when he pulls on his ears  He has recurrent ear inrfections and tubes in ears in the past.. He lost 5 lbs since last visit  Had labwork on 7/2/19 he had low Vitamin D. Thyroids tudies normal hga1c normal. Lipid panel normal. CMP showed sodium slightly low and chloride at  96.    Kidney function and liver function normal . CBC  Is normal     9/5/19 pt is here for recheck and followup. He was treated for otitis externa and ear infection. Was given liquid amoxicillin ears are better.  No pain.  He is eating okay no chest pain         Earache    There is pain in both ears. This is a recurrent problem. The current episode started more than 1 month ago. The problem occurs constantly. The problem has been gradually worsening. There has been no fever. The pain is at a severity of 6/10. The pain is moderate. Pertinent negatives include no abdominal pain, coughing, diarrhea, ear discharge, headaches, hearing loss, neck pain, rash, rhinorrhea, sore throat or vomiting. He has tried nothing for the symptoms. The treatment provided no relief. There is no history of a chronic ear infection, hearing loss or a tympanostomy tube.    Obesity   This is a chronic problem. The current episode started more than 1 year ago. The problem occurs constantly. Associated symptoms include fatigue. Pertinent negatives include no abdominal pain, anorexia, arthralgias, change in bowel habit, chest pain, chills, congestion, coughing, diaphoresis, fever, headaches, joint swelling, myalgias, nausea, neck pain, numbness, rash, sore throat, swollen glands, urinary symptoms, vertigo, visual change, vomiting or weakness. Nothing aggravates the symptoms. He has tried nothing for the symptoms. The treatment provided no relief.   Mental Health Problem   The primary symptoms do not include dysphoric mood, delusions, hallucinations, bizarre behavior, disorganized  speech, negative symptoms or somatic symptoms. This is a chronic problem.   Additional symptoms of the illness include fatigue. Additional symptoms of the illness do not include anhedonia, insomnia, hypersomnia, appetite change, unexpected weight change, agitation, psychomotor retardation, feelings of worthlessness, attention impairment, euphoric mood, increased goal-directed activity, flight of ideas, inflated self-esteem, decreased need for sleep, distractible, poor judgment, visual change, headaches, abdominal pain or seizures. He does not admit to suicidal ideas. He does not have a plan to commit suicide. He does not contemplate harming himself. He has not already injured self. He does not contemplate injuring another person.    Depression   Visit Type: initial  Progression since onset: gradually worsening  Patient is not experiencing: anhedonia, chest pain, choking sensation, compulsions, confusion, decreased concentration, depressed mood, dizziness, dry mouth, excessive worry, fatigue, feelings of hopelessness, feelings of worthlessness, hypersomnia, hyperventilation, impotence, insomnia, irritability, malaise, memory impairment, muscle tension, nausea, nervousness/anxiety, obsessions, palpitations, panic, psychomotor agitation, psychomotor retardation, restlessness, shortness of breath, suicidal ideas, suicidal planning, thoughts of death, weight gain and weight loss.  Severity: moderate   Sleep quality: good    Review of Systems  Review of Systems   Constitutional: Positive for activity change and fatigue.   HENT: Positive for ear pain.    Psychiatric/Behavioral: Positive for agitation and behavioral problems.       Patient Active Problem List   Diagnosis   • Annual physical exam   • General medical examination   • Depression   • Encounter for screening for endocrine disorder   • Class 1 obesity with body mass index (BMI) of 34.0 to 34.9 in adult   • Autism   • Otitis externa of both ears     Past Surgical  History:   Procedure Laterality Date   • INJECTION OF MEDICATION  11/19/2013    Dexamethasone (1)   • INNER EAR SURGERY  08/20/2004    Retained tube of right ear, extruding tube of left ear into the ext. auditory canal wiht persistent tympanic membrane perforation and bilateral creumen impactions   • MYRINGOTOMY Bilateral 06/29/2001     Social History     Socioeconomic History   • Marital status: Single     Spouse name: Not on file   • Number of children: Not on file   • Years of education: Not on file   • Highest education level: Not on file   Tobacco Use   • Smoking status: Never Smoker   • Smokeless tobacco: Never Used   Substance and Sexual Activity   • Alcohol use: No   • Sexual activity: No     Family History   Problem Relation Age of Onset   • Arthritis Mother    • Hypertension Mother    • Cancer Sister    • Depression Sister    • Anxiety disorder Maternal Aunt    • Depression Maternal Aunt    • Cancer Paternal Aunt    • Arthritis Maternal Grandmother    • Depression Maternal Grandmother    • Hypertension Maternal Grandmother    • Cancer Maternal Grandfather    • Cancer Paternal Grandmother      Lab on 07/02/2019   Component Date Value Ref Range Status   • Hemoglobin A1C 07/02/2019 5.40  4.80 - 5.60 % Final   • Total Cholesterol 07/02/2019 162  0 - 200 mg/dL Final   • Triglycerides 07/02/2019 110  0 - 150 mg/dL Final   • HDL Cholesterol 07/02/2019 47  40 - 60 mg/dL Final   • LDL Cholesterol  07/02/2019 93  0 - 100 mg/dL Final   • VLDL Cholesterol 07/02/2019 22  5 - 40 mg/dL Final   • LDL/HDL Ratio 07/02/2019 1.98   Final   • 25 Hydroxy, Vitamin D 07/02/2019 11.8* 30.0 - 100.0 ng/ml Final      No image results found.    @[email or web address]@  Immunization History   Administered Date(s) Administered   • DTaP 1999, 1999, 1999, 06/06/2000, 05/21/2003   • Hep B, Adolescent or Pediatric 1999, 1999, 1999   • Hib (HbOC) 1999, 1999, 1999, 06/06/2000   • IPV 1999,  "1999, 1999, 06/06/2000   • MMR 06/06/2000, 05/21/2003   • Tdap 08/16/2010   • Varicella 06/06/2000, 08/16/2010       The following portions of the patient's history were reviewed and updated as appropriate: allergies, current medications, past family history, past medical history, past social history, past surgical history and problem list.        Physical Exam  /80   Pulse 90   Temp 97.9 °F (36.6 °C)   Ht 162.6 cm (64\")   Wt 87.4 kg (192 lb 9.6 oz)   SpO2 97%   BMI 33.06 kg/m²     Physical Exam   Constitutional: He is oriented to person, place, and time. He appears well-developed and well-nourished.   HENT:   Head: Normocephalic and atraumatic.   Right Ear: External ear normal.   Eyes: Conjunctivae and EOM are normal. Pupils are equal, round, and reactive to light.   Neck: Normal range of motion. Neck supple.   Cardiovascular: Normal rate, regular rhythm and normal heart sounds.   No murmur heard.  Pulmonary/Chest: Effort normal and breath sounds normal. No respiratory distress.   Abdominal: Soft. Bowel sounds are normal. He exhibits no distension. There is no tenderness.   Obese abdomen    Musculoskeletal: Normal range of motion. He exhibits no edema or deformity.   Neurological: He is alert and oriented to person, place, and time. No cranial nerve deficit.   Skin: Skin is warm. No rash noted. He is not diaphoretic. No erythema. No pallor.   Psychiatric: He has a normal mood and affect. His behavior is normal.   Nursing note and vitals reviewed.      Assessment/Plan    Diagnosis Plan   1. Otitis externa of both ears, unspecified chronicity, unspecified type     2. Autism     3. Class 1 obesity with body mass index (BMI) of 34.0 to 34.9 in adult, unspecified obesity type, unspecified whether serious comorbidity present     4. Episode of recurrent major depressive disorder, unspecified depression episode severity (CMS/HCC)           -went over labwork   -obesity -counseled weight loss >5 " jeromy. Gave healthy diet eating tips and high fiber diet   -wrote letter stating today pt should not be left at home alone due to safety issues counseled mother about safety of patient.   -filled Florencia Taylor form   -advised pt to be safe and call with any questions or concerns. All questions addressed today.    -depression - was referred  to counseling at [facility] for counseling . Evaluation appreciated.   -consider antidepressant such as zoloft. Pt is not suicidal  -gave information on austim and depression today.    -recheck in  3 months   -vitamin D deficiency - vitamin D once a week  -otitis externa - cortisporin drop 4 drops to each ear 3 times a day for 5 days   -advised pt to be safe and call with any questions or concerns        This document has been electronically signed by Chalo Sinha MD on September 5, 2019 2:52 PM

## 2019-09-05 ENCOUNTER — OFFICE VISIT (OUTPATIENT)
Dept: FAMILY MEDICINE CLINIC | Facility: CLINIC | Age: 19
End: 2019-09-05

## 2019-09-05 VITALS
DIASTOLIC BLOOD PRESSURE: 80 MMHG | OXYGEN SATURATION: 97 % | BODY MASS INDEX: 32.88 KG/M2 | TEMPERATURE: 97.9 F | WEIGHT: 192.6 LBS | HEART RATE: 90 BPM | SYSTOLIC BLOOD PRESSURE: 120 MMHG | HEIGHT: 64 IN

## 2019-09-05 DIAGNOSIS — F84.0 AUTISM: ICD-10-CM

## 2019-09-05 DIAGNOSIS — F33.9 EPISODE OF RECURRENT MAJOR DEPRESSIVE DISORDER, UNSPECIFIED DEPRESSION EPISODE SEVERITY (HCC): ICD-10-CM

## 2019-09-05 DIAGNOSIS — H60.93 OTITIS EXTERNA OF BOTH EARS, UNSPECIFIED CHRONICITY, UNSPECIFIED TYPE: Primary | ICD-10-CM

## 2019-09-05 DIAGNOSIS — E66.9 CLASS 1 OBESITY WITH BODY MASS INDEX (BMI) OF 34.0 TO 34.9 IN ADULT, UNSPECIFIED OBESITY TYPE, UNSPECIFIED WHETHER SERIOUS COMORBIDITY PRESENT: ICD-10-CM

## 2019-09-05 PROCEDURE — 99213 OFFICE O/P EST LOW 20 MIN: CPT | Performed by: FAMILY MEDICINE

## 2020-01-04 NOTE — PROGRESS NOTES
Subjective:  Gila Cerrato is a 19 y.o. male who presents for       Patient Active Problem List   Diagnosis   • Annual physical exam   • General medical examination   • Depression   • Encounter for screening for endocrine disorder   • Class 1 obesity with body mass index (BMI) of 34.0 to 34.9 in adult   • Autism   • Otitis externa of both ears   • Encounter for weight loss counseling   • Vitamin D deficiency           Current Outpatient Medications:   •  Selenium Sulfide 2.25 % lotion, Apply 1 application topically Daily. Apply daily for 5-15 minutes and then rinsed. Use daily for 1 week, then weekly for 1 month., Disp: 180 mL, Rfl: 3  •  vitamin D (ERGOCALCIFEROL) 1.25 MG (96768 UT) capsule capsule, Take 1 capsule by mouth Every 7 (Seven) Days., Disp: 4 capsule, Rfl: 3    HPI        Pt is 18 yo male with history of vitamin D deficiency,  Autism, obesity,  Major depression         8/22/19 pt is here for recheck. He has two issues. He has trouble sleeping at night. He does not drink caffeine no loud noises no radio. He also has bilateral ear pain for  >2 weeks. No fever. It hurts when he pulls on his ears  He has recurrent ear inrfections and tubes in ears in the past.. He lost 5 lbs since last visit  Had labwork on 7/2/19 he had low Vitamin D. Thyroids tudies normal hga1c normal. Lipid panel normal. CMP showed sodium slightly low and chloride at  96.    Kidney function and liver function normal . CBC  Is normal      9/5/19 pt is here for recheck and followup. He was treated for otitis externa and ear infection. Was given liquid amoxicillin ears are better.  No pain.  He is eating okay no chest pain     1/6/19 pt is here for recheck and followup.  He gained 6 lbs since last visit.  He does not vegetables regularly does not exercise         Earache    There is pain in both ears. This is a recurrent problem. The current episode started more than 1 month ago. The problem occurs constantly. The problem has  been gradually worsening. There has been no fever. The pain is at a severity of 6/10. The pain is moderate. Pertinent negatives include no abdominal pain, coughing, diarrhea, ear discharge, headaches, hearing loss, neck pain, rash, rhinorrhea, sore throat or vomiting. He has tried nothing for the symptoms. The treatment provided no relief. There is no history of a chronic ear infection, hearing loss or a tympanostomy tube.    Obesity   This is a chronic problem. The current episode started more than 1 year ago. The problem occurs constantly. Associated symptoms include fatigue. Pertinent negatives include no abdominal pain, anorexia, arthralgias, change in bowel habit, chest pain, chills, congestion, coughing, diaphoresis, fever, headaches, joint swelling, myalgias, nausea, neck pain, numbness, rash, sore throat, swollen glands, urinary symptoms, vertigo, visual change, vomiting or weakness. Nothing aggravates the symptoms. He has tried nothing for the symptoms. The treatment provided no relief.   Mental Health Problem   The primary symptoms do not include dysphoric mood, delusions, hallucinations, bizarre behavior, disorganized speech, negative symptoms or somatic symptoms. This is a chronic problem.   Additional symptoms of the illness include fatigue. Additional symptoms of the illness do not include anhedonia, insomnia, hypersomnia, appetite change, unexpected weight change, agitation, psychomotor retardation, feelings of worthlessness, attention impairment, euphoric mood, increased goal-directed activity, flight of ideas, inflated self-esteem, decreased need for sleep, distractible, poor judgment, visual change, headaches, abdominal pain or seizures. He does not admit to suicidal ideas. He does not have a plan to commit suicide. He does not contemplate harming himself. He has not already injured self. He does not contemplate injuring another person.    Depression   Visit Type: initial  Progression since  onset: gradually worsening  Patient is not experiencing: anhedonia, chest pain, choking sensation, compulsions, confusion, decreased concentration, depressed mood, dizziness, dry mouth, excessive worry, fatigue, feelings of hopelessness, feelings of worthlessness, hypersomnia, hyperventilation, impotence, insomnia, irritability, malaise, memory impairment, muscle tension, nausea, nervousness/anxiety, obsessions, palpitations, panic, psychomotor agitation, psychomotor retardation, restlessness, shortness of breath, suicidal ideas, suicidal planning, thoughts of death, weight gain and weight loss.  Severity: moderate   Sleep quality: good    Review of Systems  Review of Systems   Constitutional: Positive for activity change and fatigue. Negative for appetite change, chills, diaphoresis and fever.   HENT: Negative for congestion, postnasal drip, rhinorrhea, sinus pressure, sinus pain, sneezing, sore throat, trouble swallowing and voice change.    Respiratory: Negative for cough, choking, chest tightness, shortness of breath, wheezing and stridor.    Cardiovascular: Negative for chest pain.   Gastrointestinal: Negative for diarrhea, nausea and vomiting.   Neurological: Positive for weakness and numbness. Negative for headaches.   Psychiatric/Behavioral: Positive for agitation and behavioral problems. The patient is nervous/anxious.        Patient Active Problem List   Diagnosis   • Annual physical exam   • General medical examination   • Depression   • Encounter for screening for endocrine disorder   • Class 1 obesity with body mass index (BMI) of 34.0 to 34.9 in adult   • Autism   • Otitis externa of both ears   • Encounter for weight loss counseling   • Vitamin D deficiency     Past Surgical History:   Procedure Laterality Date   • INJECTION OF MEDICATION  11/19/2013    Dexamethasone (1)   • INNER EAR SURGERY  08/20/2004    Retained tube of right ear, extruding tube of left ear into the ext. auditory canal jamie  persistent tympanic membrane perforation and bilateral creumen impactions   • MYRINGOTOMY Bilateral 06/29/2001     Social History     Socioeconomic History   • Marital status: Single     Spouse name: Not on file   • Number of children: Not on file   • Years of education: Not on file   • Highest education level: Not on file   Tobacco Use   • Smoking status: Never Smoker   • Smokeless tobacco: Never Used   Substance and Sexual Activity   • Alcohol use: No   • Sexual activity: Never     Family History   Problem Relation Age of Onset   • Arthritis Mother    • Hypertension Mother    • Cancer Sister    • Depression Sister    • Anxiety disorder Maternal Aunt    • Depression Maternal Aunt    • Cancer Paternal Aunt    • Arthritis Maternal Grandmother    • Depression Maternal Grandmother    • Hypertension Maternal Grandmother    • Cancer Maternal Grandfather    • Cancer Paternal Grandmother      No visits with results within 6 Month(s) from this visit.   Latest known visit with results is:   Lab on 07/02/2019   Component Date Value Ref Range Status   • Hemoglobin A1C 07/02/2019 5.40  4.80 - 5.60 % Final   • Total Cholesterol 07/02/2019 162  0 - 200 mg/dL Final   • Triglycerides 07/02/2019 110  0 - 150 mg/dL Final   • HDL Cholesterol 07/02/2019 47  40 - 60 mg/dL Final   • LDL Cholesterol  07/02/2019 93  0 - 100 mg/dL Final   • VLDL Cholesterol 07/02/2019 22  5 - 40 mg/dL Final   • LDL/HDL Ratio 07/02/2019 1.98   Final   • 25 Hydroxy, Vitamin D 07/02/2019 11.8* 30.0 - 100.0 ng/ml Final      No image results found.    [unfilled]  Immunization History   Administered Date(s) Administered   • DTaP 1999, 1999, 1999, 06/06/2000, 05/21/2003   • Hep B, Adolescent or Pediatric 1999, 1999, 1999   • Hib (HbOC) 1999, 1999, 1999, 06/06/2000   • IPV 1999, 1999, 1999, 06/06/2000   • MMR 06/06/2000, 05/21/2003   • Tdap 08/16/2010   • Varicella 06/06/2000, 08/16/2010  "      The following portions of the patient's history were reviewed and updated as appropriate: allergies, current medications, past family history, past medical history, past social history, past surgical history and problem list.        Physical Exam  /70 (BP Location: Right arm, Patient Position: Sitting, Cuff Size: Adult)   Pulse 73   Temp 97.8 °F (36.6 °C) (Oral)   Ht 162.6 cm (64\")   Wt 89.9 kg (198 lb 3.2 oz)   SpO2 99%   BMI 34.02 kg/m²     Physical Exam   Constitutional: He is oriented to person, place, and time. He appears well-developed and well-nourished.   HENT:   Head: Normocephalic and atraumatic.   Right Ear: External ear normal.   Eyes: Pupils are equal, round, and reactive to light. Conjunctivae and EOM are normal.   Neck: Normal range of motion. Neck supple.   Cardiovascular: Normal rate, regular rhythm and normal heart sounds.   No murmur heard.  Pulmonary/Chest: Effort normal and breath sounds normal. No respiratory distress.   Abdominal: Soft. Bowel sounds are normal. He exhibits no distension. There is no tenderness.   Obese abdomen    Musculoskeletal: Normal range of motion. He exhibits no edema or deformity.   Neurological: He is alert and oriented to person, place, and time. No cranial nerve deficit.   Skin: Skin is warm. No rash noted. He is not diaphoretic. No erythema. No pallor.   Psychiatric: He has a normal mood and affect. His behavior is normal.   Nursing note and vitals reviewed.      Assessment/Plan    Diagnosis Plan   1. Autism     2. Episode of recurrent major depressive disorder, unspecified depression episode severity (CMS/[facility])     3. Class 1 obesity with body mass index (BMI) of 34.0 to 34.9 in adult, unspecified obesity type, unspecified whether serious comorbidity present  CBC Auto Differential    Comprehensive Metabolic Panel    Vitamin D 25 Hydroxy   4. Vitamin D deficiency  CBC Auto Differential    Comprehensive Metabolic Panel    Vitamin D 25 Hydroxy   5. " Encounter for weight loss counseling           -recommend labwork  - pt and mother declined    -obesity -counseled weight loss >5 mintues. Gave healthy diet eating tips and high fiber diet  BMI at 34.0   -wrote letter stating today pt should not be left at home alone due to safety issues counseled mother about safety of patient.   -filled Flore[employer] form   -advised pt to be safe and call with any questions or concerns. All questions addressed today.    -depression - was referred  to counseling at [facility] for counseling . Evaluation appreciated.   -consider antidepressant such as zoloft. Pt is not suicidal  -gave information on austim and depression today.    -recheck in  3 months   -vitamin D deficiency - vitamin D once a week  -otitis externa - cortisporin drop 4 drops to each ear 3 times a day for 5 days   -advised pt to be safe and call with any questions or concerns  -adivsed pt to followup with specialist and referrals  -advised pt to go to ER or call 911 if symptoms worrisome or severe        This document has been electronically signed by Chalo Sinha MD on January 6, 2020 11:57 AM

## 2020-01-06 ENCOUNTER — OFFICE VISIT (OUTPATIENT)
Dept: FAMILY MEDICINE CLINIC | Facility: CLINIC | Age: 20
End: 2020-01-06

## 2020-01-06 VITALS
DIASTOLIC BLOOD PRESSURE: 70 MMHG | BODY MASS INDEX: 33.84 KG/M2 | HEIGHT: 64 IN | TEMPERATURE: 97.8 F | HEART RATE: 73 BPM | WEIGHT: 198.2 LBS | SYSTOLIC BLOOD PRESSURE: 114 MMHG | OXYGEN SATURATION: 99 %

## 2020-01-06 DIAGNOSIS — E66.9 CLASS 1 OBESITY WITH BODY MASS INDEX (BMI) OF 34.0 TO 34.9 IN ADULT, UNSPECIFIED OBESITY TYPE, UNSPECIFIED WHETHER SERIOUS COMORBIDITY PRESENT: ICD-10-CM

## 2020-01-06 DIAGNOSIS — Z71.3 ENCOUNTER FOR WEIGHT LOSS COUNSELING: ICD-10-CM

## 2020-01-06 DIAGNOSIS — E55.9 VITAMIN D DEFICIENCY: ICD-10-CM

## 2020-01-06 DIAGNOSIS — F33.9 EPISODE OF RECURRENT MAJOR DEPRESSIVE DISORDER, UNSPECIFIED DEPRESSION EPISODE SEVERITY (HCC): ICD-10-CM

## 2020-01-06 DIAGNOSIS — F84.0 AUTISM: Primary | ICD-10-CM

## 2020-01-06 PROCEDURE — 99214 OFFICE O/P EST MOD 30 MIN: CPT | Performed by: FAMILY MEDICINE

## 2020-01-06 RX ORDER — ERGOCALCIFEROL 1.25 MG/1
50000 CAPSULE ORAL
Qty: 4 CAPSULE | Refills: 3 | Status: SHIPPED | OUTPATIENT
Start: 2020-01-06

## 2020-09-25 ENCOUNTER — LAB (OUTPATIENT)
Dept: LAB | Facility: HOSPITAL | Age: 20
End: 2020-09-25

## 2020-09-25 DIAGNOSIS — E66.9 CLASS 1 OBESITY WITH BODY MASS INDEX (BMI) OF 34.0 TO 34.9 IN ADULT, UNSPECIFIED OBESITY TYPE, UNSPECIFIED WHETHER SERIOUS COMORBIDITY PRESENT: ICD-10-CM

## 2020-09-25 DIAGNOSIS — E55.9 VITAMIN D DEFICIENCY: ICD-10-CM

## 2020-09-25 PROCEDURE — 80053 COMPREHEN METABOLIC PANEL: CPT

## 2020-09-25 PROCEDURE — 36415 COLL VENOUS BLD VENIPUNCTURE: CPT

## 2020-09-25 PROCEDURE — 82306 VITAMIN D 25 HYDROXY: CPT

## 2020-09-25 PROCEDURE — 85025 COMPLETE CBC W/AUTO DIFF WBC: CPT

## 2020-09-26 LAB
25(OH)D3 SERPL-MCNC: 17.2 NG/ML (ref 30–100)
ALBUMIN SERPL-MCNC: 4.6 G/DL (ref 3.5–5.2)
ALBUMIN/GLOB SERPL: 1.4 G/DL
ALP SERPL-CCNC: 63 U/L (ref 39–117)
ALT SERPL W P-5'-P-CCNC: 20 U/L (ref 1–41)
ANION GAP SERPL CALCULATED.3IONS-SCNC: 7.1 MMOL/L (ref 5–15)
AST SERPL-CCNC: 18 U/L (ref 1–40)
BASOPHILS # BLD AUTO: 0.06 10*3/MM3 (ref 0–0.2)
BASOPHILS NFR BLD AUTO: 0.8 % (ref 0–1.5)
BILIRUB SERPL-MCNC: 1.1 MG/DL (ref 0–1.2)
BUN SERPL-MCNC: 12 MG/DL (ref 6–20)
BUN/CREAT SERPL: 14.1 (ref 7–25)
CALCIUM SPEC-SCNC: 9.6 MG/DL (ref 8.6–10.5)
CHLORIDE SERPL-SCNC: 101 MMOL/L (ref 98–107)
CO2 SERPL-SCNC: 31.9 MMOL/L (ref 22–29)
CREAT SERPL-MCNC: 0.85 MG/DL (ref 0.76–1.27)
DEPRECATED RDW RBC AUTO: 50.6 FL (ref 37–54)
EOSINOPHIL # BLD AUTO: 0.21 10*3/MM3 (ref 0–0.4)
EOSINOPHIL NFR BLD AUTO: 2.9 % (ref 0.3–6.2)
ERYTHROCYTE [DISTWIDTH] IN BLOOD BY AUTOMATED COUNT: 14.2 % (ref 12.3–15.4)
GFR SERPL CREATININE-BSD FRML MDRD: 138 ML/MIN/1.73
GLOBULIN UR ELPH-MCNC: 3.3 GM/DL
GLUCOSE SERPL-MCNC: 84 MG/DL (ref 65–99)
HCT VFR BLD AUTO: 48 % (ref 37.5–51)
HGB BLD-MCNC: 15.8 G/DL (ref 13–17.7)
IMM GRANULOCYTES # BLD AUTO: 0.01 10*3/MM3 (ref 0–0.05)
IMM GRANULOCYTES NFR BLD AUTO: 0.1 % (ref 0–0.5)
LYMPHOCYTES # BLD AUTO: 2.22 10*3/MM3 (ref 0.7–3.1)
LYMPHOCYTES NFR BLD AUTO: 31.1 % (ref 19.6–45.3)
MCH RBC QN AUTO: 32 PG (ref 26.6–33)
MCHC RBC AUTO-ENTMCNC: 32.9 G/DL (ref 31.5–35.7)
MCV RBC AUTO: 97.2 FL (ref 79–97)
MONOCYTES # BLD AUTO: 0.6 10*3/MM3 (ref 0.1–0.9)
MONOCYTES NFR BLD AUTO: 8.4 % (ref 5–12)
NEUTROPHILS NFR BLD AUTO: 4.04 10*3/MM3 (ref 1.7–7)
NEUTROPHILS NFR BLD AUTO: 56.7 % (ref 42.7–76)
NRBC BLD AUTO-RTO: 0 /100 WBC (ref 0–0.2)
PLATELET # BLD AUTO: 279 10*3/MM3 (ref 140–450)
PMV BLD AUTO: 12.3 FL (ref 6–12)
POTASSIUM SERPL-SCNC: 4.5 MMOL/L (ref 3.5–5.2)
PROT SERPL-MCNC: 7.9 G/DL (ref 6–8.5)
RBC # BLD AUTO: 4.94 10*6/MM3 (ref 4.14–5.8)
SODIUM SERPL-SCNC: 140 MMOL/L (ref 136–145)
WBC # BLD AUTO: 7.14 10*3/MM3 (ref 3.4–10.8)

## 2020-09-28 ENCOUNTER — TELEPHONE (OUTPATIENT)
Dept: FAMILY MEDICINE CLINIC | Facility: CLINIC | Age: 20
End: 2020-09-28

## 2020-09-28 NOTE — TELEPHONE ENCOUNTER
----- Message from Chalo Sinha MD sent at 9/26/2020  1:40 AM CDT -----  Please call pt     labwork stable except Vitamin D is low. Recommend pt start taking Vitamin D3 50,000 units once a week give 4 pills and 3 refills    Recheck on next visit. Thanks

## 2020-09-29 ENCOUNTER — TELEPHONE (OUTPATIENT)
Dept: FAMILY MEDICINE CLINIC | Facility: CLINIC | Age: 20
End: 2020-09-29

## 2020-09-30 ENCOUNTER — TELEPHONE (OUTPATIENT)
Dept: FAMILY MEDICINE CLINIC | Facility: CLINIC | Age: 20
End: 2020-09-30

## 2020-10-02 ENCOUNTER — TELEPHONE (OUTPATIENT)
Dept: FAMILY MEDICINE CLINIC | Facility: CLINIC | Age: 20
End: 2020-10-02

## 2020-10-12 ENCOUNTER — TELEPHONE (OUTPATIENT)
Dept: FAMILY MEDICINE CLINIC | Facility: CLINIC | Age: 20
End: 2020-10-12

## 2020-10-12 RX ORDER — ERGOCALCIFEROL 1.25 MG/1
50000 CAPSULE ORAL WEEKLY
Qty: 4 CAPSULE | Refills: 3 | Status: SHIPPED | OUTPATIENT
Start: 2020-10-12 | End: 2021-09-28 | Stop reason: SDUPTHER

## 2020-10-12 NOTE — TELEPHONE ENCOUNTER
PATIENTS MOTHER CALLED IN STATING SHE RECEIVED A LETTER SAYING THAT THE PATIENT NEEDS TO BE ON VITAMIN D. SHE WOULD LIKE THESE CALLED IN TO THE PHARMACY FOR HER.    CALLBACK-  [phone]    PHARMACY:  [id number] [place] [address] [address] AT [facility] [address] - [phone]  - [phone]   [phone]

## 2021-08-18 ENCOUNTER — TELEPHONE (OUTPATIENT)
Dept: FAMILY MEDICINE CLINIC | Facility: CLINIC | Age: 21
End: 2021-08-18

## 2021-08-18 NOTE — TELEPHONE ENCOUNTER
Mother (Jaqui Grant) would like a call back from Dr. Sinha . She is filling out some paperwork for her son  and has some questions . Please call  [phone]

## 2021-08-18 NOTE — TELEPHONE ENCOUNTER
Pt stated that is was for disability and stated she could not discuss anything with me. I informed her that the pt would need a visit in order for PCP to help fill these out since pt has not been seen in over a year. Pt stated he does not need a visit and  needs to call her today so she can ask him the questions.

## 2021-09-08 ENCOUNTER — TELEPHONE (OUTPATIENT)
Dept: FAMILY MEDICINE CLINIC | Facility: CLINIC | Age: 21
End: 2021-09-08

## 2021-09-10 NOTE — PROGRESS NOTES
Chief Complaint  No chief complaint on file.    Subjective          Trajan Israel Cerrato presents to [facility] - [place]          Pt is 20 yo male with history of vitamin D deficiency,  Autism, obesity,  Major depression     1/6/19 pt is here for recheck and followup.  He gained 6 lbs since last visit.  He does not vegetables regularly does not exercise     9/15/21 in office vii for recheck on pt's above medical issues. Pt is with mother today. Pt is due for new labwork . He lost 27 lbs since January 2020.  Pt has been reporting some headaches lately.  Pt is vague historian and mother is not willing to talk today.  He is not seeing any specialist with behavioral health.      Headache   This is a recurrent problem. The current episode started more than 1 month ago. The problem occurs constantly. The problem has been unchanged. The pain is located in the frontal region. The pain does not radiate. The pain quality is not similar to prior headaches. The pain is at a severity of 3/10. The pain is moderate. Pertinent negatives include no abdominal pain, abnormal behavior, anorexia, back pain, blurred vision, coughing, dizziness, drainage, ear pain, eye pain, eye redness, facial sweating, fever, hearing loss, insomnia, loss of balance, muscle aches, nausea, neck pain, numbness, phonophobia, photophobia, rhinorrhea, scalp tenderness, seizures, sinus pressure, sore throat, swollen glands, tingling, tinnitus, visual change, vomiting, weakness or weight loss. Nothing aggravates the symptoms. He has tried nothing for the symptoms. The treatment provided no relief. There is no history of cancer, cluster headaches, hypertension, immunosuppression, migraine headaches, migraines in the family, obesity, pseudotumor cerebri, recent head traumas, sinus disease or TMJ.   Obesity   This is a chronic problem. The current episode started more than 1 year ago. The problem occurs constantly.  Associated symptoms include fatigue. Pertinent negatives include no abdominal pain, anorexia, arthralgias, change in bowel habit, chest pain, chills, congestion, coughing, diaphoresis, fever, headaches, joint swelling, myalgias, nausea, neck pain, numbness, rash, sore throat, swollen glands, urinary symptoms, vertigo, visual change, vomiting or weakness. Nothing aggravates the symptoms. He has tried nothing for the symptoms. The treatment provided no relief.   Mental Health Problem   The primary symptoms do not include dysphoric mood, delusions, hallucinations, bizarre behavior, disorganized speech, negative symptoms or somatic symptoms. This is a chronic problem.   Additional symptoms of the illness include fatigue. Additional symptoms of the illness do not include anhedonia, insomnia, hypersomnia, appetite change, unexpected weight change, agitation, psychomotor retardation, feelings of worthlessness, attention impairment, euphoric mood, increased goal-directed activity, flight of ideas, inflated self-esteem, decreased need for sleep, distractible, poor judgment, visual change, headaches, abdominal pain or seizures. He does not admit to suicidal ideas. He does not have a plan to commit suicide. He does not contemplate harming himself. He has not already injured self. He does not contemplate injuring another person.    Depression   Visit Type: initial  Progression since onset: gradually worsening  Patient is not experiencing: anhedonia, chest pain, choking sensation, compulsions, confusion, decreased concentration, depressed mood, dizziness, dry mouth, excessive worry, fatigue, feelings of hopelessness, feelings of worthlessness, hypersomnia, hyperventilation, impotence, insomnia, irritability, malaise, memory impairment, muscle tension, nausea, nervousness/anxiety, obsessions, palpitations, panic, psychomotor agitation, psychomotor retardation, restlessness, shortness of breath, suicidal ideas, suicidal  "planning, thoughts of death, weight gain and weight loss.  Severity: moderate   Sleep quality: good      Objective   Vital Signs:   /76 (BP Location: Left arm, Patient Position: Sitting, Cuff Size: Adult)   Pulse 92   Temp 97.8 °F (36.6 °C)   Ht 162.6 cm (64\")   Wt 77.9 kg (171 lb 12.8 oz)   SpO2 98%   BMI 29.49 kg/m²     Physical Exam  Vitals and nursing note reviewed.   Constitutional:       Appearance: He is well-developed. He is not diaphoretic.   HENT:      Head: Normocephalic and atraumatic.      Right Ear: External ear normal.   Eyes:      Conjunctiva/sclera: Conjunctivae normal.      Pupils: Pupils are equal, round, and reactive to light.   Cardiovascular:      Rate and Rhythm: Normal rate and regular rhythm.      Heart sounds: Normal heart sounds. No murmur heard.     Pulmonary:      Effort: Pulmonary effort is normal. No respiratory distress.      Breath sounds: Normal breath sounds.   Abdominal:      General: Bowel sounds are normal. There is no distension.      Palpations: Abdomen is soft.      Tenderness: There is no abdominal tenderness.      Comments: Obese abdomen    Musculoskeletal:         General: No tenderness or deformity. Normal range of motion.      Cervical back: Normal range of motion and neck supple.   Skin:     General: Skin is warm.      Coloration: Skin is not pale.      Findings: No erythema or rash.   Neurological:      Mental Status: He is alert and oriented to person, place, and time.      Cranial Nerves: No cranial nerve deficit.      Comments: CN 2-12 intact    Psychiatric:         Behavior: Behavior normal.        Result Review :                 Assessment and Plan    Diagnoses and all orders for this visit:    1. Autism (Primary)  -     CBC Auto Differential; Future  -     Comprehensive Metabolic Panel; Future  -     Hemoglobin A1c; Future  -     Lipid Panel; Future  -     TSH; Future  -     T4, Free; Future  -     Vitamin D 25 Hydroxy; Future  -     Vitamin B12; " Future  -     Hepatitis C Antibody; Future    2. Vitamin D deficiency  -     CBC Auto Differential; Future  -     Comprehensive Metabolic Panel; Future  -     Hemoglobin A1c; Future  -     Lipid Panel; Future  -     TSH; Future  -     T4, Free; Future  -     Vitamin D 25 Hydroxy; Future  -     Vitamin B12; Future  -     Hepatitis C Antibody; Future    3. Episode of recurrent major depressive disorder, unspecified depression episode severity (CMS/HCC)  -     CBC Auto Differential; Future  -     Comprehensive Metabolic Panel; Future  -     Hemoglobin A1c; Future  -     Lipid Panel; Future  -     TSH; Future  -     T4, Free; Future  -     Vitamin D 25 Hydroxy; Future  -     Vitamin B12; Future  -     Hepatitis C Antibody; Future    4. Need for hepatitis C screening test  -     CBC Auto Differential; Future  -     Comprehensive Metabolic Panel; Future  -     Hemoglobin A1c; Future  -     Lipid Panel; Future  -     TSH; Future  -     T4, Free; Future  -     Vitamin D 25 Hydroxy; Future  -     Vitamin B12; Future  -     Hepatitis C Antibody; Future    5. Annual physical exam  -     CBC Auto Differential; Future  -     Comprehensive Metabolic Panel; Future  -     Hemoglobin A1c; Future  -     Lipid Panel; Future  -     TSH; Future  -     T4, Free; Future  -     Vitamin D 25 Hydroxy; Future  -     Vitamin B12; Future  -     Hepatitis C Antibody; Future    6. Encounter for screening for endocrine disorder  -     CBC Auto Differential; Future  -     Comprehensive Metabolic Panel; Future  -     Hemoglobin A1c; Future  -     Lipid Panel; Future  -     TSH; Future  -     T4, Free; Future  -     Vitamin D 25 Hydroxy; Future  -     Vitamin B12; Future  -     Hepatitis C Antibody; Future    7. Encounter for screening for diabetes mellitus  -     CBC Auto Differential; Future  -     Comprehensive Metabolic Panel; Future  -     Hemoglobin A1c; Future  -     Lipid Panel; Future  -     TSH; Future  -     T4, Free; Future  -     Vitamin  D 25 Hydroxy; Future  -     Vitamin B12; Future  -     Hepatitis C Antibody; Future    8. Encounter for screening for cardiovascular disorders  -     CBC Auto Differential; Future  -     Comprehensive Metabolic Panel; Future  -     Hemoglobin A1c; Future  -     Lipid Panel; Future  -     TSH; Future  -     T4, Free; Future  -     Vitamin D 25 Hydroxy; Future  -     Vitamin B12; Future  -     Hepatitis C Antibody; Future    9. Overweight            -recommend labwork   -recommend Tdap vaccination  -recommend COVID-19 vaccination  -hep C screening - hep C antibody arthur   -headaches  -recommend hydration and more water intake , tylenol PRN. Pt is vague historian and mother is not willing to provided any further information.  Phyiscal exam and history taking was limited today.    -overweight - counseled weigth loss >5 minutes BMI at 29,.49   -depression - was referred  to counseling at [facility] for counseling . Evaluation appreciated.  but currently is not seeing anyone.    -vitamin D deficiency - vitamin D once a week  -advised pt to be safe and call with any questions or concerns  -adivsed pt to followup with specialist and referrals  -advised pt to go to ER or call 911 if symptoms worrisome or severe  I spent 30 minutes caring for Gila on this date of service. This time includes time spent by me in the following activities: preparing for the visit, reviewing tests, obtaining and/or reviewing a separately obtained history, performing a medically appropriate examination and/or evaluation, counseling and educating the patient/family/caregiver, ordering medications, tests, or procedures, referring and communicating with other health care professionals, documenting information in the medical record, independently interpreting results and communicating that information with the patient/family/caregiver and care coordination.         This document has been electronically signed by Chalo Sinha MD on  September 15, 2021 15:39 CDT          Follow Up   Return in about 6 months (around 3/15/2022).  Patient was given instructions and counseling regarding his condition or for health maintenance advice. Please see specific information pulled into the AVS if appropriate.

## 2021-09-15 ENCOUNTER — OFFICE VISIT (OUTPATIENT)
Dept: FAMILY MEDICINE CLINIC | Facility: CLINIC | Age: 21
End: 2021-09-15

## 2021-09-15 VITALS
WEIGHT: 171.8 LBS | TEMPERATURE: 97.8 F | DIASTOLIC BLOOD PRESSURE: 76 MMHG | HEIGHT: 64 IN | OXYGEN SATURATION: 98 % | BODY MASS INDEX: 29.33 KG/M2 | HEART RATE: 92 BPM | SYSTOLIC BLOOD PRESSURE: 128 MMHG

## 2021-09-15 DIAGNOSIS — Z13.1 ENCOUNTER FOR SCREENING FOR DIABETES MELLITUS: ICD-10-CM

## 2021-09-15 DIAGNOSIS — Z13.29 ENCOUNTER FOR SCREENING FOR ENDOCRINE DISORDER: ICD-10-CM

## 2021-09-15 DIAGNOSIS — F33.9 EPISODE OF RECURRENT MAJOR DEPRESSIVE DISORDER, UNSPECIFIED DEPRESSION EPISODE SEVERITY (HCC): ICD-10-CM

## 2021-09-15 DIAGNOSIS — E55.9 VITAMIN D DEFICIENCY: ICD-10-CM

## 2021-09-15 DIAGNOSIS — Z00.00 ANNUAL PHYSICAL EXAM: ICD-10-CM

## 2021-09-15 DIAGNOSIS — Z13.6 ENCOUNTER FOR SCREENING FOR CARDIOVASCULAR DISORDERS: ICD-10-CM

## 2021-09-15 DIAGNOSIS — E66.3 OVERWEIGHT: ICD-10-CM

## 2021-09-15 DIAGNOSIS — Z11.59 NEED FOR HEPATITIS C SCREENING TEST: ICD-10-CM

## 2021-09-15 DIAGNOSIS — F84.0 AUTISM: Primary | ICD-10-CM

## 2021-09-15 PROCEDURE — 99214 OFFICE O/P EST MOD 30 MIN: CPT | Performed by: FAMILY MEDICINE

## 2021-09-27 NOTE — PROGRESS NOTES
Chief Complaint  Follow-up (fill out papers for [employer]SI)    Subjective          Gila Israel Cerrato presents to [facility] - [place]  History of Present Illness     Pt is 22 yo male with history of vitamin D deficiency,  Autism, obesity,  Major depression     9/15/21 in office viist for recheck on pt's above medical issues. Pt is with mother today. Pt is due for new labwork . He lost 27 lbs since January 2020.  Pt has been reporting some headaches lately.  Pt is vague historian and mother is not willing to talk today.  He is not seeing any specialist with behavioral health.      9/28/21 in office visit for recheck on pt's above medical issues. Pt has yet to get labwork. Pt is a poor historian and on last visit pt's mother would not converse or talk to discuss his issues.. per patient he is having headaches like last visit. He is also needing paperwork filled out for his social security.  Per mother's knowledge he has not had any head trauma, or fall.       Headache   This is a recurrent problem. The current episode started more than 1 month ago. The problem occurs constantly. The problem has been unchanged. The pain is located in the frontal region. The pain does not radiate. The pain quality is not similar to prior headaches. The pain is at a severity of 3/10. The pain is moderate. Pertinent negatives include no abdominal pain, abnormal behavior, anorexia, back pain, blurred vision, coughing, dizziness, drainage, ear pain, eye pain, eye redness, facial sweating, fever, hearing loss, insomnia, loss of balance, muscle aches, nausea, neck pain, numbness, phonophobia, photophobia, rhinorrhea, scalp tenderness, seizures, sinus pressure, sore throat, swollen glands, tingling, tinnitus, visual change, vomiting, weakness or weight loss. Nothing aggravates the symptoms. He has tried nothing for the symptoms. The treatment provided no relief. There is no history of cancer, cluster  "headaches, hypertension, immunosuppression, migraine headaches, migraines in the family, obesity, pseudotumor cerebri, recent head traumas, sinus disease or TMJ.   Mental Health Problem   The primary symptoms do not include dysphoric mood, delusions, hallucinations, bizarre behavior, disorganized speech, negative symptoms or somatic symptoms. This is a chronic problem.   Additional symptoms of the illness include fatigue. Additional symptoms of the illness do not include anhedonia, insomnia, hypersomnia, appetite change, unexpected weight change, agitation, psychomotor retardation, feelings of worthlessness, attention impairment, euphoric mood, increased goal-directed activity, flight of ideas, inflated self-esteem, decreased need for sleep, distractible, poor judgment, visual change, headaches, abdominal pain or seizures. He does not admit to suicidal ideas. He does not have a plan to commit suicide. He does not contemplate harming himself. He has not already injured self. He does not contemplate injuring another person.    Depression   Visit Type: followup   Progression since onset: gradually worsening  Patient is not experiencing: anhedonia, chest pain, choking sensation, compulsions, confusion, decreased concentration, depressed mood, dizziness, dry mouth, excessive worry, fatigue, feelings of hopelessness, feelings of worthlessness, hypersomnia, hyperventilation, impotence, insomnia, irritability, malaise, memory impairment, muscle tension, nausea, nervousness/anxiety, obsessions, palpitations, panic, psychomotor agitation, psychomotor retardation, restlessness, shortness of breath, suicidal ideas, suicidal planning, thoughts of death, weight gain and weight loss.  Severity: moderate   Sleep quality: good    Objective   Vital Signs:   /78 (BP Location: Right arm, Patient Position: Sitting, Cuff Size: Adult)   Pulse 65   Temp 98.6 °F (37 °C) (Temporal)   Resp 20   Ht 162.6 cm (64\")   Wt 76.3 kg (168 " lb 2 oz)   SpO2 99%   BMI 28.86 kg/m²     Physical Exam  Vitals and nursing note reviewed.   Constitutional:       Appearance: He is well-developed. He is not diaphoretic.   HENT:      Head: Normocephalic and atraumatic.      Right Ear: External ear normal.   Eyes:      Conjunctiva/sclera: Conjunctivae normal.      Pupils: Pupils are equal, round, and reactive to light.   Cardiovascular:      Rate and Rhythm: Normal rate and regular rhythm.      Heart sounds: Normal heart sounds. No murmur heard.     Pulmonary:      Effort: Pulmonary effort is normal. No respiratory distress.      Breath sounds: Normal breath sounds.   Abdominal:      General: Bowel sounds are normal. There is no distension.      Palpations: Abdomen is soft.      Tenderness: There is no abdominal tenderness.   Musculoskeletal:         General: No deformity. Normal range of motion.      Cervical back: Normal range of motion and neck supple.   Skin:     General: Skin is warm.      Coloration: Skin is not pale.      Findings: No erythema or rash.   Neurological:      Mental Status: He is alert and oriented to person, place, and time.      Cranial Nerves: No cranial nerve deficit.      Comments: CN 2-12 intact    Psychiatric:         Behavior: Behavior normal.        Result Review :                 Assessment and Plan    Diagnoses and all orders for this visit:    1. Autism (Primary)    2. Vitamin D deficiency    3. Nonintractable headache, unspecified chronicity pattern, unspecified headache type    Other orders  -     vitamin D (ERGOCALCIFEROL) 1.25 MG (43695 UT) capsule capsule; Take 1 capsule by mouth 1 (One) Time Per Week.  Dispense: 4 capsule; Refill: 3            -recommend labwork   -recommend Tdap vaccination  -recommend COVID-19 vaccination  -hep C screening - hep C antibody test  -Had long discussion with pt's mother today with pt's ongoing care. [facility] Practice Manager was present in room.  During office visit pt was  hesitant in answering questions pertaining to patient.  I discussed that in order to continue taking care of patient the mother will need to communicate on future visits.  Pt understand and has decided after today's visit she will look for another provider to take care of her son.  Will fill out pt's paperwork  -headaches  -recommend hydration and more water intake , tylenol PRN. Pt is vague historian and mother is not willing to provided any further information.  Phyiscal exam and history taking was limited today.    -overweight - counseled weigth loss >5 minutes BMI at 28.86   -depression - was referred  to counseling at [facility] for counseling . Evaluation appreciated. Currently pt is  not seeing anyone.    -vitamin D deficiency - vitamin D once a week  -advised pt to be safe and call with any questions or concerns  -adivsed pt to followup with specialist and referrals  -advised pt to go to ER or call 911 if symptoms worrisome or severe  I spent 30 minutes caring for Gila on this date of service. This time includes time spent by me in the following activities: preparing for the visit, reviewing tests, obtaining and/or reviewing a separately obtained history, performing a medically appropriate examination and/or evaluation, counseling and educating the patient/family/caregiver, ordering medications, tests, or procedures, referring and communicating with other health care professionals, documenting information in the medical record, independently interpreting results and communicating that information with the patient/family/caregiver and care coordination.   -recheck PRN         This document has been electronically signed by Chalo Sinha MD on September 28, 2021 16:54 CDT        Follow Up   No follow-ups on file.  Patient was given instructions and counseling regarding his condition or for health maintenance advice. Please see specific information pulled into the AVS if appropriate.

## 2021-09-28 ENCOUNTER — OFFICE VISIT (OUTPATIENT)
Dept: FAMILY MEDICINE CLINIC | Facility: CLINIC | Age: 21
End: 2021-09-28

## 2021-09-28 VITALS
WEIGHT: 168.13 LBS | RESPIRATION RATE: 20 BRPM | OXYGEN SATURATION: 99 % | SYSTOLIC BLOOD PRESSURE: 110 MMHG | TEMPERATURE: 98.6 F | HEART RATE: 65 BPM | HEIGHT: 64 IN | DIASTOLIC BLOOD PRESSURE: 78 MMHG | BODY MASS INDEX: 28.7 KG/M2

## 2021-09-28 DIAGNOSIS — F84.0 AUTISM: Primary | ICD-10-CM

## 2021-09-28 DIAGNOSIS — R51.9 NONINTRACTABLE HEADACHE, UNSPECIFIED CHRONICITY PATTERN, UNSPECIFIED HEADACHE TYPE: ICD-10-CM

## 2021-09-28 DIAGNOSIS — E55.9 VITAMIN D DEFICIENCY: ICD-10-CM

## 2021-09-28 PROCEDURE — 99214 OFFICE O/P EST MOD 30 MIN: CPT | Performed by: FAMILY MEDICINE

## 2021-09-28 RX ORDER — ERGOCALCIFEROL 1.25 MG/1
50000 CAPSULE ORAL WEEKLY
Qty: 4 CAPSULE | Refills: 3 | Status: SHIPPED | OUTPATIENT
Start: 2021-09-28

## 2022-02-22 ENCOUNTER — TELEPHONE (OUTPATIENT)
Dept: FAMILY MEDICINE CLINIC | Facility: CLINIC | Age: 22
End: 2022-02-22